# Patient Record
Sex: MALE | Race: WHITE | NOT HISPANIC OR LATINO | Employment: FULL TIME | URBAN - METROPOLITAN AREA
[De-identification: names, ages, dates, MRNs, and addresses within clinical notes are randomized per-mention and may not be internally consistent; named-entity substitution may affect disease eponyms.]

---

## 2023-09-20 ENCOUNTER — TELEPHONE (OUTPATIENT)
Dept: INTERNAL MEDICINE | Age: 33
End: 2023-09-20

## 2023-11-27 ENCOUNTER — LAB SERVICES (OUTPATIENT)
Dept: LAB | Age: 33
End: 2023-11-27
Attending: STUDENT IN AN ORGANIZED HEALTH CARE EDUCATION/TRAINING PROGRAM

## 2023-11-27 ENCOUNTER — OFFICE VISIT (OUTPATIENT)
Dept: INTERNAL MEDICINE | Age: 33
End: 2023-11-27
Attending: STUDENT IN AN ORGANIZED HEALTH CARE EDUCATION/TRAINING PROGRAM

## 2023-11-27 VITALS
SYSTOLIC BLOOD PRESSURE: 139 MMHG | DIASTOLIC BLOOD PRESSURE: 77 MMHG | OXYGEN SATURATION: 100 % | HEART RATE: 83 BPM | HEIGHT: 75 IN | WEIGHT: 171.1 LBS | BODY MASS INDEX: 21.27 KG/M2

## 2023-11-27 DIAGNOSIS — I10 PRIMARY HYPERTENSION: ICD-10-CM

## 2023-11-27 DIAGNOSIS — N41.1 PROSTATITIS, CHRONIC: ICD-10-CM

## 2023-11-27 DIAGNOSIS — Z11.3 ROUTINE SCREENING FOR STI (SEXUALLY TRANSMITTED INFECTION): ICD-10-CM

## 2023-11-27 DIAGNOSIS — Z80.8 FAMILY HISTORY OF MELANOMA: Primary | ICD-10-CM

## 2023-11-27 DIAGNOSIS — Z23 NEED FOR VACCINATION: ICD-10-CM

## 2023-11-27 DIAGNOSIS — Z30.2 ENCOUNTER FOR VASECTOMY: ICD-10-CM

## 2023-11-27 DIAGNOSIS — Z00.00 PREVENTATIVE HEALTH CARE: ICD-10-CM

## 2023-11-27 LAB — HBA1C MFR BLD: 4.9 % (ref 4.5–5.6)

## 2023-11-27 PROCEDURE — 83036 HEMOGLOBIN GLYCOSYLATED A1C: CPT

## 2023-11-27 PROCEDURE — 90715 TDAP VACCINE 7 YRS/> IM: CPT | Performed by: STUDENT IN AN ORGANIZED HEALTH CARE EDUCATION/TRAINING PROGRAM

## 2023-11-27 PROCEDURE — 86592 SYPHILIS TEST NON-TREP QUAL: CPT

## 2023-11-27 PROCEDURE — 87389 HIV-1 AG W/HIV-1&-2 AB AG IA: CPT

## 2023-11-27 PROCEDURE — 10002800 HB RX 250 W HCPCS: Performed by: STUDENT IN AN ORGANIZED HEALTH CARE EDUCATION/TRAINING PROGRAM

## 2023-11-27 PROCEDURE — 99202 OFFICE O/P NEW SF 15 MIN: CPT

## 2023-11-27 PROCEDURE — 99385 PREV VISIT NEW AGE 18-39: CPT | Performed by: STUDENT IN AN ORGANIZED HEALTH CARE EDUCATION/TRAINING PROGRAM

## 2023-11-27 PROCEDURE — 90471 IMMUNIZATION ADMIN: CPT | Performed by: STUDENT IN AN ORGANIZED HEALTH CARE EDUCATION/TRAINING PROGRAM

## 2023-11-27 PROCEDURE — 99203 OFFICE O/P NEW LOW 30 MIN: CPT | Performed by: STUDENT IN AN ORGANIZED HEALTH CARE EDUCATION/TRAINING PROGRAM

## 2023-11-27 RX ORDER — AMLODIPINE BESYLATE 5 MG/1
5 TABLET ORAL DAILY
Qty: 90 TABLET | Refills: 3 | Status: SHIPPED | OUTPATIENT
Start: 2023-11-27

## 2023-11-27 RX ADMIN — TETANUS TOXOID, REDUCED DIPHTHERIA TOXOID AND ACELLULAR PERTUSSIS VACCINE, ADSORBED 0.5 ML: 5; 2.5; 8; 8; 2.5 SUSPENSION INTRAMUSCULAR at 11:47

## 2023-11-27 ASSESSMENT — PATIENT HEALTH QUESTIONNAIRE - PHQ9
SUM OF ALL RESPONSES TO PHQ9 QUESTIONS 1 AND 2: 0
2. FEELING DOWN, DEPRESSED OR HOPELESS: NOT AT ALL
CLINICAL INTERPRETATION OF PHQ2 SCORE: NO FURTHER SCREENING NEEDED
SUM OF ALL RESPONSES TO PHQ9 QUESTIONS 1 AND 2: 0
1. LITTLE INTEREST OR PLEASURE IN DOING THINGS: NOT AT ALL

## 2023-11-28 LAB
HIV 1+2 AB+HIV1 P24 AG SERPL QL IA: NONREACTIVE
RPR SER QL: NONREACTIVE

## 2024-02-18 ENCOUNTER — E-ADVICE (OUTPATIENT)
Dept: INTERNAL MEDICINE | Age: 34
End: 2024-02-18

## 2024-02-19 ENCOUNTER — E-ADVICE (OUTPATIENT)
Dept: INTERNAL MEDICINE | Age: 34
End: 2024-02-19

## 2024-02-19 ENCOUNTER — TELEPHONE (OUTPATIENT)
Dept: INTERNAL MEDICINE | Age: 34
End: 2024-02-19

## 2024-02-19 DIAGNOSIS — I10 PRIMARY HYPERTENSION: ICD-10-CM

## 2024-02-29 ENCOUNTER — APPOINTMENT (OUTPATIENT)
Dept: LAB | Age: 34
End: 2024-02-29
Attending: STUDENT IN AN ORGANIZED HEALTH CARE EDUCATION/TRAINING PROGRAM

## 2024-02-29 ENCOUNTER — OFFICE VISIT (OUTPATIENT)
Dept: INTERNAL MEDICINE | Age: 34
End: 2024-02-29
Attending: STUDENT IN AN ORGANIZED HEALTH CARE EDUCATION/TRAINING PROGRAM

## 2024-02-29 VITALS
SYSTOLIC BLOOD PRESSURE: 123 MMHG | RESPIRATION RATE: 14 BRPM | DIASTOLIC BLOOD PRESSURE: 71 MMHG | HEIGHT: 75 IN | OXYGEN SATURATION: 99 % | BODY MASS INDEX: 21.42 KG/M2 | WEIGHT: 172.3 LBS | HEART RATE: 83 BPM

## 2024-02-29 DIAGNOSIS — I10 PRIMARY HYPERTENSION: Primary | ICD-10-CM

## 2024-02-29 DIAGNOSIS — J30.9 CHRONIC ALLERGIC RHINITIS: ICD-10-CM

## 2024-02-29 DIAGNOSIS — Z13.220 SCREENING FOR LIPID DISORDERS: ICD-10-CM

## 2024-02-29 LAB
CHOLEST SERPL-MCNC: 201 MG/DL
CHOLEST/HDLC SERPL: 3.5 {RATIO}
HDLC SERPL-MCNC: 57 MG/DL
LDLC SERPL CALC-MCNC: 123 MG/DL
NONHDLC SERPL-MCNC: 144 MG/DL
TRIGL SERPL-MCNC: 106 MG/DL

## 2024-02-29 PROCEDURE — 99214 OFFICE O/P EST MOD 30 MIN: CPT | Performed by: STUDENT IN AN ORGANIZED HEALTH CARE EDUCATION/TRAINING PROGRAM

## 2024-02-29 PROCEDURE — 80061 LIPID PANEL: CPT | Performed by: STUDENT IN AN ORGANIZED HEALTH CARE EDUCATION/TRAINING PROGRAM

## 2024-02-29 PROCEDURE — 83695 ASSAY OF LIPOPROTEIN(A): CPT | Performed by: STUDENT IN AN ORGANIZED HEALTH CARE EDUCATION/TRAINING PROGRAM

## 2024-03-03 LAB — LPA SERPL-MCNC: 70 MG/DL

## 2024-03-04 ENCOUNTER — E-ADVICE (OUTPATIENT)
Dept: INTERNAL MEDICINE | Age: 34
End: 2024-03-04

## 2024-04-16 ENCOUNTER — E-ADVICE (OUTPATIENT)
Dept: INTERNAL MEDICINE | Age: 34
End: 2024-04-16

## 2024-04-16 DIAGNOSIS — Z30.2 ENCOUNTER FOR VASECTOMY: Primary | ICD-10-CM

## 2024-05-04 ENCOUNTER — E-ADVICE (OUTPATIENT)
Dept: INTERNAL MEDICINE | Age: 34
End: 2024-05-04

## 2024-05-31 ENCOUNTER — TELEPHONE (OUTPATIENT)
Dept: UROLOGY | Age: 34
End: 2024-05-31

## 2024-05-31 ENCOUNTER — OFFICE VISIT (OUTPATIENT)
Dept: UROLOGY | Age: 34
End: 2024-05-31

## 2024-05-31 VITALS — RESPIRATION RATE: 16 BRPM | SYSTOLIC BLOOD PRESSURE: 126 MMHG | HEART RATE: 68 BPM | DIASTOLIC BLOOD PRESSURE: 82 MMHG

## 2024-05-31 DIAGNOSIS — Z30.09 VASECTOMY EVALUATION: Primary | ICD-10-CM

## 2024-06-13 ENCOUNTER — OFFICE VISIT (OUTPATIENT)
Dept: INTERNAL MEDICINE | Age: 34
End: 2024-06-13

## 2024-06-13 ENCOUNTER — LAB SERVICES (OUTPATIENT)
Dept: LAB | Age: 34
End: 2024-06-13

## 2024-06-13 VITALS
OXYGEN SATURATION: 100 % | HEIGHT: 75 IN | TEMPERATURE: 97.4 F | WEIGHT: 171.4 LBS | BODY MASS INDEX: 21.31 KG/M2 | SYSTOLIC BLOOD PRESSURE: 106 MMHG | DIASTOLIC BLOOD PRESSURE: 70 MMHG | HEART RATE: 72 BPM

## 2024-06-13 DIAGNOSIS — B35.4 TINEA CORPORIS: ICD-10-CM

## 2024-06-13 DIAGNOSIS — I10 PRIMARY HYPERTENSION: Primary | ICD-10-CM

## 2024-06-13 DIAGNOSIS — E78.41 ELEVATED LIPOPROTEIN(A): ICD-10-CM

## 2024-06-13 DIAGNOSIS — Z11.3 ROUTINE SCREENING FOR STI (SEXUALLY TRANSMITTED INFECTION): ICD-10-CM

## 2024-06-13 LAB — HIV 1+2 AB+HIV1 P24 AG SERPL QL IA: NONREACTIVE

## 2024-06-13 PROCEDURE — 87591 N.GONORRHOEAE DNA AMP PROB: CPT | Performed by: STUDENT IN AN ORGANIZED HEALTH CARE EDUCATION/TRAINING PROGRAM

## 2024-06-13 PROCEDURE — 87389 HIV-1 AG W/HIV-1&-2 AB AG IA: CPT

## 2024-06-13 PROCEDURE — 86592 SYPHILIS TEST NON-TREP QUAL: CPT

## 2024-06-13 PROCEDURE — 99214 OFFICE O/P EST MOD 30 MIN: CPT | Performed by: STUDENT IN AN ORGANIZED HEALTH CARE EDUCATION/TRAINING PROGRAM

## 2024-06-13 RX ORDER — AMLODIPINE BESYLATE 5 MG/1
5 TABLET ORAL DAILY
Qty: 90 TABLET | Refills: 3 | Status: SHIPPED | OUTPATIENT
Start: 2024-06-13

## 2024-06-13 ASSESSMENT — PATIENT HEALTH QUESTIONNAIRE - PHQ9
CLINICAL INTERPRETATION OF PHQ2 SCORE: NO FURTHER SCREENING NEEDED
SUM OF ALL RESPONSES TO PHQ9 QUESTIONS 1 AND 2: 0
1. LITTLE INTEREST OR PLEASURE IN DOING THINGS: NOT AT ALL
2. FEELING DOWN, DEPRESSED OR HOPELESS: NOT AT ALL
SUM OF ALL RESPONSES TO PHQ9 QUESTIONS 1 AND 2: 0

## 2024-06-14 LAB
C TRACH RRNA UR QL NAA+PROBE: NEGATIVE
Lab: NORMAL
N GONORRHOEA RRNA UR QL NAA+PROBE: NEGATIVE
RPR SER QL: NONREACTIVE

## 2024-06-25 ENCOUNTER — APPOINTMENT (OUTPATIENT)
Dept: DERMATOLOGY | Age: 34
End: 2024-06-25
Attending: STUDENT IN AN ORGANIZED HEALTH CARE EDUCATION/TRAINING PROGRAM

## 2024-06-25 ENCOUNTER — TELEPHONE (OUTPATIENT)
Dept: UROLOGY | Age: 34
End: 2024-06-25

## 2024-06-25 DIAGNOSIS — L91.8 SKIN TAG: ICD-10-CM

## 2024-06-25 DIAGNOSIS — L90.5 SCAR: ICD-10-CM

## 2024-06-25 DIAGNOSIS — D22.9 MULTIPLE NEVI: ICD-10-CM

## 2024-06-25 DIAGNOSIS — Z80.8 FAMILY HISTORY OF MALIGNANT MELANOMA OF SKIN: ICD-10-CM

## 2024-06-25 DIAGNOSIS — L92.0 GRANULOMA ANNULARE: ICD-10-CM

## 2024-06-25 DIAGNOSIS — Z12.83 SKIN CANCER SCREENING: Primary | ICD-10-CM

## 2024-06-25 PROCEDURE — 99204 OFFICE O/P NEW MOD 45 MIN: CPT | Performed by: DERMATOLOGY

## 2024-06-25 RX ORDER — TRIAMCINOLONE ACETONIDE 1 MG/G
OINTMENT TOPICAL
Qty: 30 G | Refills: 0 | Status: SHIPPED | OUTPATIENT
Start: 2024-06-25

## 2024-06-26 ENCOUNTER — APPOINTMENT (OUTPATIENT)
Dept: UROLOGY | Age: 34
End: 2024-06-26

## 2024-06-26 DIAGNOSIS — Z30.09 VASECTOMY EVALUATION: Primary | ICD-10-CM

## 2024-06-26 PROCEDURE — 55250 REMOVAL OF SPERM DUCT(S): CPT | Performed by: STUDENT IN AN ORGANIZED HEALTH CARE EDUCATION/TRAINING PROGRAM

## 2024-06-28 ENCOUNTER — E-ADVICE (OUTPATIENT)
Dept: UROLOGY | Age: 34
End: 2024-06-28

## 2024-06-28 ENCOUNTER — APPOINTMENT (OUTPATIENT)
Dept: INTERNAL MEDICINE | Age: 34
End: 2024-06-28
Attending: STUDENT IN AN ORGANIZED HEALTH CARE EDUCATION/TRAINING PROGRAM

## 2024-07-16 ENCOUNTER — TELEPHONE (OUTPATIENT)
Dept: UROLOGY | Age: 34
End: 2024-07-16

## 2024-07-17 ENCOUNTER — TELEPHONE (OUTPATIENT)
Dept: UROLOGY | Age: 34
End: 2024-07-17

## 2024-07-31 ENCOUNTER — APPOINTMENT (OUTPATIENT)
Dept: UROLOGY | Age: 34
End: 2024-07-31

## 2024-07-31 VITALS
WEIGHT: 171 LBS | SYSTOLIC BLOOD PRESSURE: 118 MMHG | BODY MASS INDEX: 21.26 KG/M2 | DIASTOLIC BLOOD PRESSURE: 62 MMHG | HEIGHT: 75 IN

## 2024-07-31 DIAGNOSIS — Z30.09 VASECTOMY EVALUATION: Primary | ICD-10-CM

## 2024-07-31 PROCEDURE — 99024 POSTOP FOLLOW-UP VISIT: CPT | Performed by: STUDENT IN AN ORGANIZED HEALTH CARE EDUCATION/TRAINING PROGRAM

## 2024-08-20 DIAGNOSIS — I10 PRIMARY HYPERTENSION: ICD-10-CM

## 2024-08-24 DIAGNOSIS — I10 PRIMARY HYPERTENSION: ICD-10-CM

## 2024-08-26 RX ORDER — AMLODIPINE BESYLATE 5 MG/1
5 TABLET ORAL DAILY
Qty: 90 TABLET | Refills: 3 | Status: SHIPPED | OUTPATIENT
Start: 2024-08-24 | End: 2024-08-27 | Stop reason: SDUPTHER

## 2024-08-27 DIAGNOSIS — I10 PRIMARY HYPERTENSION: ICD-10-CM

## 2024-08-28 RX ORDER — AMLODIPINE BESYLATE 5 MG/1
5 TABLET ORAL DAILY
Qty: 90 TABLET | Refills: 3 | OUTPATIENT
Start: 2024-08-28

## 2024-08-28 RX ORDER — AMLODIPINE BESYLATE 5 MG/1
5 TABLET ORAL DAILY
Qty: 90 TABLET | Refills: 3 | Status: SHIPPED | OUTPATIENT
Start: 2024-08-28

## 2024-11-14 DIAGNOSIS — I10 PRIMARY HYPERTENSION: ICD-10-CM

## 2024-11-15 RX ORDER — AMLODIPINE BESYLATE 5 MG/1
5 TABLET ORAL DAILY
Qty: 90 TABLET | Refills: 3 | Status: SHIPPED | OUTPATIENT
Start: 2024-11-15

## 2024-11-18 DIAGNOSIS — I10 PRIMARY HYPERTENSION: ICD-10-CM

## 2024-11-18 RX ORDER — AMLODIPINE BESYLATE 5 MG/1
5 TABLET ORAL DAILY
Qty: 90 TABLET | Refills: 3 | Status: CANCELLED | OUTPATIENT
Start: 2024-11-18

## 2024-11-26 ENCOUNTER — OFFICE VISIT (OUTPATIENT)
Dept: FAMILY MEDICINE | Facility: CLINIC | Age: 34
End: 2024-11-26
Payer: OTHER GOVERNMENT

## 2024-11-26 VITALS
OXYGEN SATURATION: 98 % | TEMPERATURE: 97.9 F | WEIGHT: 174.5 LBS | BODY MASS INDEX: 21.7 KG/M2 | HEART RATE: 81 BPM | RESPIRATION RATE: 17 BRPM | HEIGHT: 75 IN | SYSTOLIC BLOOD PRESSURE: 105 MMHG | DIASTOLIC BLOOD PRESSURE: 75 MMHG

## 2024-11-26 DIAGNOSIS — E78.41 HIGH SERUM LIPOPROTEIN(A): ICD-10-CM

## 2024-11-26 DIAGNOSIS — N48.89 PENILE PAIN: ICD-10-CM

## 2024-11-26 DIAGNOSIS — Z76.89 ENCOUNTER TO ESTABLISH CARE: Primary | ICD-10-CM

## 2024-11-26 DIAGNOSIS — I10 BENIGN ESSENTIAL HYPERTENSION: ICD-10-CM

## 2024-11-26 DIAGNOSIS — R10.2 PELVIC PAIN IN MALE: ICD-10-CM

## 2024-11-26 DIAGNOSIS — F41.9 ANXIETY: ICD-10-CM

## 2024-11-26 DIAGNOSIS — R30.0 DYSURIA: ICD-10-CM

## 2024-11-26 DIAGNOSIS — D13.4 HEPATOCELLULAR ADENOMA: ICD-10-CM

## 2024-11-26 DIAGNOSIS — M25.511 CHRONIC RIGHT SHOULDER PAIN: ICD-10-CM

## 2024-11-26 DIAGNOSIS — G89.29 CHRONIC RIGHT SHOULDER PAIN: ICD-10-CM

## 2024-11-26 PROCEDURE — 99204 OFFICE O/P NEW MOD 45 MIN: CPT | Performed by: STUDENT IN AN ORGANIZED HEALTH CARE EDUCATION/TRAINING PROGRAM

## 2024-11-26 RX ORDER — AMLODIPINE BESYLATE 5 MG/1
1 TABLET ORAL DAILY
COMMUNITY
Start: 2024-11-15 | End: 2024-11-26

## 2024-11-26 RX ORDER — OMEGA-3/DHA/EPA/FISH OIL 60 MG-90MG
500 CAPSULE ORAL DAILY
COMMUNITY

## 2024-11-26 RX ORDER — AMLODIPINE BESYLATE 5 MG/1
5 TABLET ORAL DAILY
Qty: 90 TABLET | Refills: 3 | Status: SHIPPED | OUTPATIENT
Start: 2024-11-26

## 2024-11-26 RX ORDER — TRIAMCINOLONE ACETONIDE 1 MG/G
OINTMENT TOPICAL 2 TIMES DAILY
COMMUNITY
Start: 2024-06-25

## 2024-11-26 ASSESSMENT — PAIN SCALES - GENERAL: PAINLEVEL_OUTOF10: MILD PAIN (2)

## 2024-11-26 NOTE — PROGRESS NOTES
Assessment & Plan     Encounter to establish care    Chronic right shoulder pain  Interested in PT. No localizing symptoms on exam to suggest specific pathology. Consider MRI/Sports Med if persistent/worsening despite PT  - Physical Therapy  Referral    Benign essential hypertension  High serum lipoprotein(a)  Controlled, goal <130/80 given young age and risk factor. Labs UTD in Care Everywhere.   - amLODIPine (NORVASC) 5 MG tablet  Dispense: 90 tablet; Refill: 3    Dysuria  Longstanding penile/pelvic symptoms as detailed below. Reports unremarkable workup through Urology in [place] - [facility] complted so we can try and find out what has already been done. He reports a dx of chronic prostatitis but without specific treatment recs, would be helpful to see specific workup. Particularly bothersome for sexual function. Has routine STI screening but does not think he has been tested for mycoplasma. Will also pursue PFPT. Offered referral to establish here w/ Urology but pt prefers to defer at this time.   - Urogenital Ureaplasma and Mycoplasma Species by PCR  - Physical Therapy  Referral    Pelvic pain in male  - Physical Therapy  Referral    Penile pain  - Physical Therapy  Referral    Anxiety  Would like to discuss possible med mgmt, will address at follow up     Hepatocellular adenoma    Care Everywhere reviewed. 2015 abd MRI showed 6.5 x 4.6cm hepatic mass, s/p biopsy with path 2/9/2015 c/w hepatic adenoma, no malignancy. He reports that no follow up or management was recommended and does not think he has discussed further w/ a specialist. Reports he had abd imaging last year through [place]rology in [place] that re-demonstrated the mass, do not have access to those records. LFTs normal at [facility] in 2023. Upon further review of up to date recs, will message pt with recommendation to re-image and refer to Hepatology to discuss further since surgical resection for men is recommended.  "    Return in about 3 months (around 2/26/2025) for Follow up, with me, for mood follow up.    Subjective   Ivan is a 34 year old, presenting for the following health issues:  Establish Care (New Est/Referral Questions)    HPI     Primary goal: establish care  HTN, on amlodipine. A pain to communicate w/ physician in [place]     Thinking again about trial-ing antidepressant for anxiety, has waffled for a long time. Would be helpful to have documented.     R shoulder: worry that he has maybe a rotator cuff injury. 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 Maybe since 2013   Starting to use the gym and lift more. Anything chest/shoulders/arms     For two years, has had penile pain that is an aching pain almost like a nerve pain that radiates from pelvis out to penis when he gets aroused. Large amount of aching after ejaculation.   - has been seen Urology     Sometimes a really intense sx that feels like UTI. Bad dysuria, chills, etc.     Urologist in [place] did a pelvis CT, said maybe chronic prostatitis?     Did accupuncture for prostatitis     [facility]     Lipoprotein a test +     Has a flu and covid shot: October 2024         Objective    /75 (BP Location: Left arm, Patient Position: Sitting, Cuff Size: Adult Regular)   Pulse 81   Temp 97.9  F (36.6  C) (Oral)   Resp 17   Ht 1.905 m (6' 3\")   Wt 79.2 kg (174 lb 8 oz)   SpO2 98%   BMI 21.81 kg/m    Body mass index is 21.81 kg/m .  Physical Exam   GENERAL: alert, cooperative, in no acute distress  HEENT: sclera clear  PULM: normal respiratory effort   Right shoulder:  - Inspections: No deformities. No muscle atrophy. Mild scapular winging  - Tenderness to " palpation: nonfocal   - AROM: full, intact withi minimal discomfort   - Strength: Supraspinatus (empty can) wnl, ER wnl, IR wnl  - Impingement:  Rowley neg, Neer neg  NEURO: alert and oriented, grossly intact, moves all extremities, normal gait   SKIN: no rashes or lesions visualized   PSYCH: euthymic affect              Signed Electronically by: Nadya Pimentel DO

## 2024-12-05 PROBLEM — E78.41 HIGH SERUM LIPOPROTEIN(A): Status: ACTIVE | Noted: 2024-12-05

## 2024-12-10 ENCOUNTER — MYC MEDICAL ADVICE (OUTPATIENT)
Dept: FAMILY MEDICINE | Facility: CLINIC | Age: 34
End: 2024-12-10

## 2024-12-10 ENCOUNTER — LAB (OUTPATIENT)
Dept: LAB | Facility: CLINIC | Age: 34
End: 2024-12-10
Payer: OTHER GOVERNMENT

## 2024-12-10 DIAGNOSIS — D13.4 HEPATOCELLULAR ADENOMA: Primary | ICD-10-CM

## 2024-12-10 DIAGNOSIS — R30.0 DYSURIA: ICD-10-CM

## 2024-12-13 NOTE — TELEPHONE ENCOUNTER
Hepatology referral for eval and mgmt recs for hepatocellular adenoma. All records in outside systems. Initial discovery and workup via [facility] in [place] in 2015. Imaging and pathology records available in Care Everywhere. Incidentally noted on CT abdomen/pelvis 3/29/22, report scanned into Media Tab.

## 2024-12-16 ENCOUNTER — TELEPHONE (OUTPATIENT)
Dept: GASTROENTEROLOGY | Facility: CLINIC | Age: 34
End: 2024-12-16
Payer: OTHER GOVERNMENT

## 2024-12-16 ENCOUNTER — MYC MEDICAL ADVICE (OUTPATIENT)
Dept: FAMILY MEDICINE | Facility: CLINIC | Age: 34
End: 2024-12-16
Payer: OTHER GOVERNMENT

## 2024-12-16 DIAGNOSIS — D13.4 HEPATOCELLULAR ADENOMA: Primary | ICD-10-CM

## 2024-12-16 DIAGNOSIS — A49.3 NGU DUE TO UREAPLASMA UREALYTICUM: Primary | ICD-10-CM

## 2024-12-16 DIAGNOSIS — N34.1 NGU DUE TO UREAPLASMA UREALYTICUM: Primary | ICD-10-CM

## 2024-12-16 RX ORDER — DOXYCYCLINE HYCLATE 100 MG
100 TABLET ORAL 2 TIMES DAILY
Qty: 14 TABLET | Refills: 0 | Status: SHIPPED | OUTPATIENT
Start: 2024-12-16 | End: 2024-12-16

## 2024-12-16 RX ORDER — DOXYCYCLINE HYCLATE 100 MG
100 TABLET ORAL 2 TIMES DAILY
Qty: 14 TABLET | Refills: 0 | Status: SHIPPED | OUTPATIENT
Start: 2024-12-16 | End: 2024-12-19

## 2024-12-16 NOTE — TELEPHONE ENCOUNTER
Left message for pt to return call.    Will inform pt lab first appointment has been scheduled per provider request prior to his appointment with Dr. Samuel on 1/24.  Will also send a my chart message.    -----------------------------------------------------------------------------------------------------------------------    Adri: Schedule next available - MD or ABDIAZIZ ok. Labs prior: CBC, CMP, INR.     Laura Reynolds

## 2024-12-19 RX ORDER — DOXYCYCLINE HYCLATE 100 MG
100 TABLET ORAL 2 TIMES DAILY
Qty: 14 TABLET | Refills: 0 | Status: SHIPPED | OUTPATIENT
Start: 2024-12-19

## 2024-12-19 ASSESSMENT — ACTIVITIES OF DAILY LIVING (ADL)
PLACING_AN_OBJECT_ON_A_HIGH_SHELF: 0
PUTTING_ON_AN_UNDERSHIRT_OR_A_PULLOVER_SWEATER: 0
PUSHING_WITH_THE_INVOLVED_ARM: 4
WASHING_YOUR_HAIR?: 0
PLEASE_INDICATE_YOR_PRIMARY_REASON_FOR_REFERRAL_TO_THERAPY:: SHOULDER
CARRYING_A_HEAVY_OBJECT_OF_10_POUNDS: 0
PUTTING_ON_YOUR_PANTS: 0
TOUCHING_THE_BACK_OF_YOUR_NECK: 0
REMOVING_SOMETHING_FROM_YOUR_BACK_POCKET: 0
AT_ITS_WORST?: 7
PUTTING_ON_A_SHIRT_THAT_BUTTONS_DOWN_THE_FRONT: 0
REACHING_FOR_SOMETHING_ON_A_HIGH_SHELF: 0
WHEN_LYING_ON_THE_INVOLVED_SIDE: 5
WASHING_YOUR_BACK: 0

## 2024-12-20 NOTE — PROGRESS NOTES
PHYSICAL THERAPY EVALUATION  Type of Visit: Evaluation              Subjective         Presenting condition or subjective complaint: (Patient-Rptd) Shoulder imbalance/winged scapula and rotator cuff pain with lifting/exercising.  Date of onset: 11/26/24 (PT referral date)    Relevant medical history: (Patient-Rptd) High blood pressure; Mental Illness   Dates & types of surgery:      Prior diagnostic imaging/testing results:       Prior therapy history for the same diagnosis, illness or injury: (Patient-Rptd) No      Living Environment  Social support: (Patient-Rptd) Alone   Type of home: (Patient-Rptd) Apartment/condo   Stairs to enter the home: (Patient-Rptd) Yes (Patient-Rptd) 20 Is there a railing: (Patient-Rptd) Yes     Ramp: (Patient-Rptd) No   Stairs inside the home: (Patient-Rptd) No       Help at home: (Patient-Rptd) None  Equipment owned:       Employment: (Patient-Rptd) Yes (Patient-Rptd) Psychologist  Hobbies/Interests: (Patient-Rptd) Hiking, biking, music (drumming, guitar), reading    Patient goals for therapy: (Patient-Rptd) Lift weights without pain, move without extra pain, 'crunching'    Ivan Goyal is a 34 year old male with a right shoulder condition. Mechanism of injury: Chronic right shoulder pain. Where: (home, work, MVA, community, recreation/sport, unknown, other): NA. Location of symptoms: anterior, stiffness in right upper trap and entire right UE. Pain level on number scale: 0-7/10. Quality of pain: heat. Associated symptoms: intermittent numbness in digits 2-5 on right. Pain frequency (constant/intermittent): intermittent. Symptoms are exacerbated by: lying on side, pushing, lifting, shoulder AROM, sleeping. Symptoms are relieved by: rest. Progression of symptoms since onset (same/better/worse): worse. Special tests (x-ray, MRI, CT scan, EMG, bone scan): None. Previous treatment: None. Improvement with previous treatment: NA. General health as reported by patient is good.  Pertinent medical history includes:  see Epic. Medical allergies includes: see Epic. Surgical history includes: see Epic. Current medications include: see Epic. Occupation: Psychologist. Patient is (working in normal job without restrictions/working in normal job with restrictions/working in an alternate job/not working due to present treatment problem): working in normal job. Primary job tasks: sitting, computer work. Barriers at home/work: None reported by patient. Red flags: None reported by patient.     Objective   Posture    Forward Head +   Rounded Shoulders    Scapular Winging +     Bilateral shoulder AROM WNL - right shoulder pain during IR    Shoulder Strength (* = pain) Right Left   FL 5/5 5/5   ABD 5-/5 5/5   ER 5-/5 5/5   IR 5/5 5/5   Biceps 5/5 5/5     Special Tests Right Left   Sandra Holt     Charles Mix     Anterior Aprehension     Posterior Apprehension     Sulcus Sign     AC Crossover     Drop-Arm     Lift-off Sign     Painful Arc -      Palpation tenderness:  Right biceps and supraspinatus  Cervical AROM min loss grossly throughout  Cervical dermatomes and myotomes WNL  Assessment & Plan   CLINICAL IMPRESSIONS  Medical Diagnosis: Chronic right shoulder pain    Treatment Diagnosis: Chronic right shoulder pain   Impression/Assessment: Patient is a 34 year old male with right shoulder complaints.  The following significant findings have been identified: Pain, Decreased ROM/flexibility, Decreased joint mobility, Decreased strength, Impaired muscle performance, Decreased activity tolerance, and Impaired posture. These impairments interfere with their ability to perform self care tasks, work tasks, recreational activities, and household chores as compared to previous level of function.     Clinical Decision Making (Complexity):  Clinical Presentation: Stable/Uncomplicated  Clinical Presentation Rationale: based on medical and personal factors listed in PT evaluation  Clinical Decision Making  (Complexity): Low complexity    PLAN OF CARE  Treatment Interventions:  Interventions: Manual Therapy, Neuromuscular Re-education, Therapeutic Activity, Therapeutic Exercise, Self-Care/Home Management    Long Term Goals     PT Goal 1  Goal Description: Patient will be able to sleep throughout the night without pain.  Rationale: to maximize safety and independence within the home  Target Date: 03/18/25      Frequency of Treatment: 1x/week  Duration of Treatment: for 4 weeks tapering down to 2x/month for 8 weeks    Recommended Referrals to Other Professionals:  None  Education Assessment:   Learner/Method: Patient;No Barriers to Learning    Risks and benefits of evaluation/treatment have been explained.   Patient/Family/caregiver agrees with Plan of Care.     Evaluation Time:     PT Eval, Low Complexity Minutes (58736): 15  Signing Clinician: Lakhwinder Monroy PT

## 2024-12-24 ENCOUNTER — THERAPY VISIT (OUTPATIENT)
Dept: PHYSICAL THERAPY | Facility: CLINIC | Age: 34
End: 2024-12-24
Attending: STUDENT IN AN ORGANIZED HEALTH CARE EDUCATION/TRAINING PROGRAM
Payer: OTHER GOVERNMENT

## 2024-12-24 DIAGNOSIS — G89.29 CHRONIC RIGHT SHOULDER PAIN: ICD-10-CM

## 2024-12-24 DIAGNOSIS — M25.511 CHRONIC RIGHT SHOULDER PAIN: ICD-10-CM

## 2024-12-24 PROCEDURE — 97530 THERAPEUTIC ACTIVITIES: CPT | Mod: GP | Performed by: PHYSICAL THERAPIST

## 2024-12-24 PROCEDURE — 97110 THERAPEUTIC EXERCISES: CPT | Mod: GP | Performed by: PHYSICAL THERAPIST

## 2024-12-24 PROCEDURE — 97161 PT EVAL LOW COMPLEX 20 MIN: CPT | Mod: GP | Performed by: PHYSICAL THERAPIST

## 2024-12-30 ENCOUNTER — THERAPY VISIT (OUTPATIENT)
Dept: PHYSICAL THERAPY | Facility: CLINIC | Age: 34
End: 2024-12-30
Attending: STUDENT IN AN ORGANIZED HEALTH CARE EDUCATION/TRAINING PROGRAM
Payer: OTHER GOVERNMENT

## 2024-12-30 DIAGNOSIS — R30.0 DYSURIA: ICD-10-CM

## 2024-12-30 DIAGNOSIS — R10.2 PELVIC PAIN IN MALE: ICD-10-CM

## 2024-12-30 DIAGNOSIS — N48.89 PENILE PAIN: ICD-10-CM

## 2024-12-30 PROCEDURE — 97530 THERAPEUTIC ACTIVITIES: CPT | Mod: GP

## 2024-12-30 PROCEDURE — 97110 THERAPEUTIC EXERCISES: CPT | Mod: GP

## 2024-12-30 PROCEDURE — 97162 PT EVAL MOD COMPLEX 30 MIN: CPT | Mod: GP

## 2024-12-30 NOTE — PROGRESS NOTES
PHYSICAL THERAPY EVALUATION  Type of Visit: Evaluation       Fall Risk Screen:  Fall screen completed by: PT  Have you fallen 2 or more times in the past year?: No  Have you fallen and had an injury in the past year?: No  Is patient a fall risk?: No    Subjective         Presenting condition or subjective complaint: (Patient-Rptd) Shoulder imbalance/winged scapula and rotator cuff pain with lifting/exercising.    Patient presents to physical therapy for evaluation. Patient reports symptoms pain and discomfort at the penis which will last for 4-5 days after ejaculation. Patient also reports the pelvic pain discomfort (cramping, urgency, aching/burning, radiating into pelvis) for approximately 3.5 years and has been better over the last year. Patient reports that he reports acupuncture had been helpful in the past. Patient report that advil has been helpful in the past and very hot baths to manage symptoms. Patient reports that symptoms are generally associated with sexual activity.     Date of onset: 11/26/24 (referral date)    Relevant medical history: (Patient-Rptd) High blood pressure; Mental Illness   No past medical history on file.   Dates & types of surgery:    No past surgical history on file.   Prior diagnostic imaging/testing results:       Prior therapy history for the same diagnosis, illness or injury: (Patient-Rptd) No      Prior Level of Function  Transfers: Independent  Ambulation: Independent  ADL: Independent  IADL:  Independent    Living Environment  Social support: (Patient-Rptd) Alone   Type of home: (Patient-Rptd) Apartment/condo   Stairs to enter the home: (Patient-Rptd) Yes (Patient-Rptd) 20 Is there a railing: (Patient-Rptd) Yes     Ramp: (Patient-Rptd) No   Stairs inside the home: (Patient-Rptd) No       Help at home: (Patient-Rptd) None  Equipment owned:       Employment: (Patient-Rptd) Yes (Patient-Rptd) Psychologist  Hobbies/Interests: (Patient-Rptd) Hiking, biking, music (drumming,  lukas, reading    Patient goals for therapy: (Patient-Rptd) Lift weights without pain, move without extra pain, 'crunching'    Pain assessment: Pain denied     Objective      PELVIC EVALUATION  ADDITIONAL HISTORY:  Sex assigned at birth: (Patient-Rptd) Male  Gender identity: (Patient-Rptd) Male    Pronouns: (Patient-Rptd) He/Him/His      Bladder History:  Feels bladder filling: (Patient-Rptd) Yes  Triggers for feeling of inability to wait to go to the bathroom: (Patient-Rptd) Yes (Patient-Rptd) A UTI-like sensation and burning that can often happen after sexual arousal or ejaculation.  How long can you wait to urinate: (Patient-Rptd) 15-20 minutes  Gets up at night to urinate: (Patient-Rptd) No    Can stop the flow of urine when urinating: (Patient-Rptd) Yes  Volume of urine usually released: (Patient-Rptd) Average   Other issues: (Patient-Rptd) Dribbling after urinating  Number of bladder infections in last 12 months:    Fluid intake per day: (Patient-Rptd) 128 mL (generally 3-5 32 oz N[place] per day) (Patient-Rptd) 1-2 cups of coffee per day, typically half caff (Patient-Rptd) 1 unit of alcohol every 2-3 weeks  Medications taken for bladder: (Patient-Rptd) No     Activities causing urine leak:      Amount of urine typically leaked:    Pads used to help with leaking: (Patient-Rptd) No        Bowel History:  Frequency of bowel movement: (Patient-Rptd) 1-2 times per day  Consistency of stool: (Patient-Rptd) Soft    Ignores the urge to defecate: (Patient-Rptd) No  Other bowel issues:    Length of time spent trying to have a bowel movement:      Sexual Function History:  Sexual orientation: (Patient-Rptd) Queer    Sexually active: (Patient-Rptd) Yes  Lubrication used: (Patient-Rptd) Yes (Patient-Rptd) Yes  Pelvic pain: (Patient-Rptd) Sitting; Initial penetration (rectal or vaginal)    Pain or difficulty with orgasms/erection/ejaculation: (Patient-Rptd) Yes (Patient-Rptd) Aching in my penis with erection at times,  definitely aching after ejaculation, sometimes a sense of UTI-like pain after ejaculation. Typically feel penis aches too much to have sex multiple times or two days in a row.  State of menopause:    Hormone medications: (Patient-Rptd) No      Are you currently pregnant: (Patient-Rptd) No  Have you been diagnosed with pelvic prolapse or abdominal separation: (Patient-Rptd) No  Do you get regular exercise: (Patient-Rptd) Yes  I do this type of exercise: (Patient-Rptd) Typically moderate weight-bearing exercise 1-2 times per week, moderate cardio 2-5 times per week.  Have you tried pelvic floor strengthening exercises for 4 weeks: (Patient-Rptd) No  Do you have any history of trauma that is relevant to your care that you d like to share: (Patient-Rptd) No    Discussed reason for referral regarding pelvic health needs and external/internal pelvic floor muscle examination with patient/guardian.  Opportunity provided to ask questions and verbal consent for assessment and intervention was given.    PAIN: Pain Level at Rest: 0/10  Pain Level with Use: 7/10  Pain Location: burning through urethra, tip of penis, pelvic pain  Pain Quality: aching, feels like penis was blown up too far  Pain Frequency: 1-2x for penile pain per week, 1-2x for pelvic pain per month, or related to sexual activity  Pain is Worst: related to activity  Pain is Exacerbated By: sexual activity, masturbation, ejaculation  Pain is Relieved By: heat and advil, urinating before sexual activity  Pain Progression: Unchanged    POSTURE: Standing Posture: Rounded shoulders, Thoracic kyphosis increased, Genu recurvatum, no pain with standing  Sitting Posture: Rounded shoulders, Forward head, Thoracic kyphosis increased  LUMBAR SCREEN: AROM WNL  HIP SCREEN:  Strength: WNL   HIP ROM: WNL and symmetrical bilaterally  Functional Strength Testing:     PELVIC/SI SCREEN:     PAIN PROVOCATION TEST:   PELVIS/SI SPECIAL TESTS:    Left Right Additional Notes   Pelvic  Compression negative negative    Pelvic Gapping negative negative    Sacral Thrust negative negative      BREATHING SYMMETRY:  demonstrates upper chest breathing pattern at rest    PELVIC EXAM  External Visual Inspection:  At rest: Elevated perineal body  With voluntary pelvic floor contraction: Perineal elevation, Present  Relaxation of PFM: Yes  With intra-abdominal pressure: Bearing down as defecation: Perineal descent    Integumentary:   Anal: WNL    External Digital Palpation per Perineum:   Levator ani: Unremarkable  Coccyx: Unremarkable  Public symphysis: Tenderness    Scar:   Location/Type:   Mobility:     Internal Digital Palpation:  Per Vagina:      Per Rectum:  Tenderness  Myofascial Resistance to Palpation: Taut  Digital Muscle Performance: P (Power): 4  E (Endurance): 10 seconds  R (Repetitions): 3  F (Fast Twitch): 10 performed  Compensations: Gluts  Relaxation Post-Contraction: Partial/delayed relaxation, able to get improved relaxation with greater time      Pelvic Organ Prolapse:       ABDOMINAL ASSESSMENT  Diastasis Rectus Abdominis (KATERINE):      Abdominal Activation/Strength:     Scar:   Location/Type:   Mobility:     Fascial Tension/Restriction:     BIOFEEDBACK:  Position:   Surface Electrodes:     Abdominals:     Perianals:       DERMATOMES:   DTR S:     Assessment & Plan   CLINICAL IMPRESSIONS  Medical Diagnosis: Dysuria  Pelvic pain in male  Penile pain    Treatment Diagnosis:     Impression/Assessment: Patient is a 34 year old male with pelvic pain, penile pain, urinary dysfunction complaints.  The following significant findings have been identified: Pain, Decreased ROM/flexibility, Decreased proprioception, Impaired muscle performance, and Decreased activity tolerance. These impairments interfere with their ability to perform self care tasks, work tasks, and recreational activities as compared to previous level of function.     Clinical Decision Making (Complexity):  Clinical Presentation:  Stable/Uncomplicated  Clinical Presentation Rationale: based on medical and personal factors listed in PT evaluation  Clinical Decision Making (Complexity): Moderate complexity    PLAN OF CARE  Treatment Interventions:  Modalities: Biofeedback, Ultrasound  Interventions: Manual Therapy, Neuromuscular Re-education, Therapeutic Activity, Therapeutic Exercise, Self-Care/Home Management    Long Term Goals            Frequency of Treatment: 1x per week for 3 weeks, 1x every other week for 9 weeks  Duration of Treatment: 12 weeks    Recommended Referrals to Other Professionals:   Education Assessment:   Learner/Method: Patient    Risks and benefits of evaluation/treatment have been explained.   Patient/Family/caregiver agrees with Plan of Care.     Evaluation Time:             Signing Clinician: Darci Wiggins, PT

## 2025-01-07 ENCOUNTER — THERAPY VISIT (OUTPATIENT)
Dept: PHYSICAL THERAPY | Facility: CLINIC | Age: 35
End: 2025-01-07
Attending: STUDENT IN AN ORGANIZED HEALTH CARE EDUCATION/TRAINING PROGRAM
Payer: OTHER GOVERNMENT

## 2025-01-07 DIAGNOSIS — R30.0 DYSURIA: ICD-10-CM

## 2025-01-07 DIAGNOSIS — R10.2 PELVIC PAIN IN MALE: ICD-10-CM

## 2025-01-07 DIAGNOSIS — N48.89 PENILE PAIN: Primary | ICD-10-CM

## 2025-01-07 PROCEDURE — 97535 SELF CARE MNGMENT TRAINING: CPT | Mod: GP

## 2025-01-07 PROCEDURE — 97530 THERAPEUTIC ACTIVITIES: CPT | Mod: GP

## 2025-01-07 NOTE — PROGRESS NOTES
Biofeedback Assessment in PT:     In supine, 5 second contract-relax:    With insertion of pelvic wand rectally, working on breathing and contract-relax-bulge:

## 2025-01-08 ENCOUNTER — THERAPY VISIT (OUTPATIENT)
Dept: PHYSICAL THERAPY | Facility: CLINIC | Age: 35
End: 2025-01-08
Attending: STUDENT IN AN ORGANIZED HEALTH CARE EDUCATION/TRAINING PROGRAM
Payer: OTHER GOVERNMENT

## 2025-01-08 DIAGNOSIS — M25.511 CHRONIC RIGHT SHOULDER PAIN: Primary | ICD-10-CM

## 2025-01-08 DIAGNOSIS — G89.29 CHRONIC RIGHT SHOULDER PAIN: Primary | ICD-10-CM

## 2025-01-08 PROCEDURE — 97110 THERAPEUTIC EXERCISES: CPT | Mod: GP | Performed by: PHYSICAL THERAPIST

## 2025-01-08 PROCEDURE — 97112 NEUROMUSCULAR REEDUCATION: CPT | Mod: GP | Performed by: PHYSICAL THERAPIST

## 2025-01-13 ENCOUNTER — THERAPY VISIT (OUTPATIENT)
Dept: PHYSICAL THERAPY | Facility: CLINIC | Age: 35
End: 2025-01-13
Attending: STUDENT IN AN ORGANIZED HEALTH CARE EDUCATION/TRAINING PROGRAM
Payer: COMMERCIAL

## 2025-01-13 DIAGNOSIS — R30.0 DYSURIA: ICD-10-CM

## 2025-01-13 DIAGNOSIS — N48.89 PENILE PAIN: Primary | ICD-10-CM

## 2025-01-13 DIAGNOSIS — R10.2 PELVIC PAIN IN MALE: ICD-10-CM

## 2025-01-13 PROCEDURE — 90912 BFB TRAINING 1ST 15 MIN: CPT | Mod: GP

## 2025-01-13 PROCEDURE — 97530 THERAPEUTIC ACTIVITIES: CPT | Mod: GP

## 2025-01-13 PROCEDURE — 97110 THERAPEUTIC EXERCISES: CPT | Mod: GP

## 2025-01-17 ENCOUNTER — ANCILLARY PROCEDURE (OUTPATIENT)
Dept: MRI IMAGING | Facility: CLINIC | Age: 35
End: 2025-01-17
Attending: STUDENT IN AN ORGANIZED HEALTH CARE EDUCATION/TRAINING PROGRAM
Payer: COMMERCIAL

## 2025-01-17 DIAGNOSIS — D13.4 HEPATOCELLULAR ADENOMA: ICD-10-CM

## 2025-01-17 PROCEDURE — A9581 GADOXETATE DISODIUM INJ: HCPCS | Performed by: RADIOLOGY

## 2025-01-17 PROCEDURE — 74183 MRI ABD W/O CNTR FLWD CNTR: CPT | Performed by: RADIOLOGY

## 2025-01-17 NOTE — DISCHARGE INSTRUCTIONS
MRI Contrast Discharge Instructions    The IV contrast you received today will pass out of your body in your  urine. This will happen in the next 24 hours. You will not feel this process.  Your urine will not change color.    Drink at least 4 extra glasses of water or juice today (unless your doctor  has restricted your fluids). This reduces the stress on your kidneys.  You may take your regular medicines.    If you are on dialysis: It is best to have dialysis today.    If you have a reaction: Most reactions happen right away. If you have  any new symptoms after leaving the hospital (such as hives or swelling),  call your hospital at the correct number below. Or call your family doctor.  If you have breathing distress or wheezing, call 911.    Special instructions: ***    I have read and understand the above information.    Signature:______________________________________ Date:___________    Staff:__________________________________________ Date:___________     Time:__________    [facility] Departments:    ___[place]: [phone]  ___[facility]: [phone]  ___[place]: [phone] ___[facility]: [phone]  ___[facility]: [phone]  ___[facility]: [phone]  ___[phone]  ___[facility]: [phone]  ___[phone]

## 2025-01-19 ENCOUNTER — HEALTH MAINTENANCE LETTER (OUTPATIENT)
Age: 35
End: 2025-01-19

## 2025-01-24 ENCOUNTER — LAB (OUTPATIENT)
Dept: LAB | Facility: CLINIC | Age: 35
End: 2025-01-24
Payer: COMMERCIAL

## 2025-01-24 DIAGNOSIS — D13.4 HEPATOCELLULAR ADENOMA: ICD-10-CM

## 2025-01-24 LAB
ALBUMIN SERPL BCG-MCNC: 4.6 G/DL (ref 3.5–5.2)
ALP SERPL-CCNC: 62 U/L (ref 40–150)
ALT SERPL W P-5'-P-CCNC: 20 U/L (ref 0–70)
ANION GAP SERPL CALCULATED.3IONS-SCNC: 9 MMOL/L (ref 7–15)
AST SERPL W P-5'-P-CCNC: 23 U/L (ref 0–45)
BILIRUB SERPL-MCNC: 0.4 MG/DL
BUN SERPL-MCNC: 17.2 MG/DL (ref 6–20)
CALCIUM SERPL-MCNC: 9.3 MG/DL (ref 8.8–10.4)
CHLORIDE SERPL-SCNC: 104 MMOL/L (ref 98–107)
CREAT SERPL-MCNC: 0.86 MG/DL (ref 0.67–1.17)
EGFRCR SERPLBLD CKD-EPI 2021: >90 ML/MIN/1.73M2
ERYTHROCYTE [DISTWIDTH] IN BLOOD BY AUTOMATED COUNT: 12.6 % (ref 10–15)
GLUCOSE SERPL-MCNC: 87 MG/DL (ref 70–99)
HCO3 SERPL-SCNC: 26 MMOL/L (ref 22–29)
HCT VFR BLD AUTO: 44.5 % (ref 40–53)
HGB BLD-MCNC: 15 G/DL (ref 13.3–17.7)
INR PPP: 1.08 (ref 0.85–1.15)
MCH RBC QN AUTO: 29.1 PG (ref 26.5–33)
MCHC RBC AUTO-ENTMCNC: 33.7 G/DL (ref 31.5–36.5)
MCV RBC AUTO: 86 FL (ref 78–100)
PLATELET # BLD AUTO: 198 10E3/UL (ref 150–450)
POTASSIUM SERPL-SCNC: 4.2 MMOL/L (ref 3.4–5.3)
PROT SERPL-MCNC: 7 G/DL (ref 6.4–8.3)
RBC # BLD AUTO: 5.16 10E6/UL (ref 4.4–5.9)
SODIUM SERPL-SCNC: 139 MMOL/L (ref 135–145)
WBC # BLD AUTO: 5 10E3/UL (ref 4–11)

## 2025-01-24 PROCEDURE — 80053 COMPREHEN METABOLIC PANEL: CPT | Performed by: PATHOLOGY

## 2025-01-24 PROCEDURE — 36415 COLL VENOUS BLD VENIPUNCTURE: CPT | Performed by: PATHOLOGY

## 2025-01-24 PROCEDURE — 85027 COMPLETE CBC AUTOMATED: CPT | Performed by: PATHOLOGY

## 2025-01-24 PROCEDURE — 85610 PROTHROMBIN TIME: CPT | Performed by: PATHOLOGY

## 2025-01-28 NOTE — TELEPHONE ENCOUNTER
RECORDS STATUS - ALL OTHER DIAGNOSIS      Imaging DISC Received  January 30, 2025 2:39 PM    Action: Imaging disc from [facility] received and taken to Narcisa FARAH for upload.     Action    Action Taken 1/28/25  Spoke w/ [facility] - confirmed they have imaging. Emailed request for imaging.    Spoke w/ Sarah @ [facility] - advised they participate in [facility]. Requested imaging via [facility].  10:20 AM       RECORDS RECEIVED FROM:    DATE RECEIVED:    NOTES STATUS DETAILS   OFFICE NOTE from referring provider     OFFICE NOTE from medical oncologist     DISCHARGE SUMMARY from hospital     DISCHARGE REPORT from the ER     OPERATIVE REPORT     MEDICATION LIST     CLINICAL TRIAL TREATMENTS TO DATE     LABS     PATHOLOGY REPORTS     ANYTHING RELATED TO DIAGNOSIS     PATHOLOGY FEDEX TRACKING   TRACKING #:    GENONOMIC TESTING     TYPE:     IMAGING (NEED IMAGES & REPORT)     CT SCANS  [facility]  3/29/22    [facility]  1/7/15   MRI  [facility]  1/28/15   XRAYS     ULTRASOUND  [facility]  2/9/15   PET     IMAGE DISC FEDEX TRACKING   [facility]  TRACKING #: [id number]

## 2025-02-03 ENCOUNTER — THERAPY VISIT (OUTPATIENT)
Dept: PHYSICAL THERAPY | Facility: CLINIC | Age: 35
End: 2025-02-03
Payer: COMMERCIAL

## 2025-02-03 DIAGNOSIS — M25.511 CHRONIC RIGHT SHOULDER PAIN: Primary | ICD-10-CM

## 2025-02-03 DIAGNOSIS — G89.29 CHRONIC RIGHT SHOULDER PAIN: Primary | ICD-10-CM

## 2025-02-03 PROCEDURE — 97110 THERAPEUTIC EXERCISES: CPT | Mod: GP | Performed by: PHYSICAL THERAPIST

## 2025-02-03 PROCEDURE — 97112 NEUROMUSCULAR REEDUCATION: CPT | Mod: GP | Performed by: PHYSICAL THERAPIST

## 2025-02-03 NOTE — PROGRESS NOTES
"Virtual Visit Details    Type of service:  Video Visit     3:00 PM - 3:46 PM    Originating Location (pt. Location): {video visit patient location:576250::\"Home\"}  {PROVIDER LOCATION On-site should be selected for visits conducted from your clinic location or adjoining [facility] hospital, academic office, or other nearby [facility] building. Off-site should be selected for all other provider locations, including home:390685}  Distant Location (provider location):  {virtual location provider:358306}  Platform used for Video Visit: {Virtual Visit Platforms:975107::\"PixelSteam\"}      Surgical Oncology - New Patient  2/4/2025    ***    Of Note: ***    ***    Liver Biopsy (2/9/2015): ***    MRI Liver (1/17/2025): ***    Labs (1/24/2025): ***    Assessment/Plan:  ***    Questions answered and the patient was in agreement with and understanding of the plan.    A total of *** minutes were spent on this encounter including face to face consultation, imaging review, chart review, documentation, and coordination of care.  "

## 2025-02-04 ENCOUNTER — VIRTUAL VISIT (OUTPATIENT)
Dept: SURGERY | Facility: CLINIC | Age: 35
End: 2025-02-04
Attending: INTERNAL MEDICINE
Payer: COMMERCIAL

## 2025-02-04 ENCOUNTER — PRE VISIT (OUTPATIENT)
Dept: SURGERY | Facility: CLINIC | Age: 35
End: 2025-02-04
Payer: COMMERCIAL

## 2025-02-04 VITALS
BODY MASS INDEX: 21.14 KG/M2 | HEIGHT: 75 IN | DIASTOLIC BLOOD PRESSURE: 65 MMHG | WEIGHT: 170 LBS | SYSTOLIC BLOOD PRESSURE: 132 MMHG

## 2025-02-04 DIAGNOSIS — D13.4 HEPATOCELLULAR ADENOMA: Primary | ICD-10-CM

## 2025-02-04 ASSESSMENT — PAIN SCALES - GENERAL: PAINLEVEL_OUTOF10: NO PAIN (0)

## 2025-02-04 NOTE — NURSING NOTE
Current patient location: [facility]    Is the patient currently in the state of [place]? YES    Visit mode: VIDEO    If the visit is dropped, the patient can be reconnected by:VIDEO VISIT: Send to e-mail at: [email or web address]    Will anyone else be joining the visit? YES: How would they like to receive their invitation? Text to cell phone: [phone]  (If patient encounters technical issues they should call [phone])    Are changes needed to the allergy or medication list? Pt stated no changes to allergies and Pt stated no med changes    Are refills needed on medications prescribed by this physician? NO    Rooming Documentation:  Questionnaire(s) not done per department protocol    Reason for visit: Consult    Elise YEN

## 2025-02-07 ENCOUNTER — PREP FOR PROCEDURE (OUTPATIENT)
Dept: ONCOLOGY | Facility: CLINIC | Age: 35
End: 2025-02-07

## 2025-02-07 ENCOUNTER — PATIENT OUTREACH (OUTPATIENT)
Dept: ONCOLOGY | Facility: CLINIC | Age: 35
End: 2025-02-07

## 2025-02-07 DIAGNOSIS — D13.4 HEPATOCELLULAR ADENOMA: Primary | ICD-10-CM

## 2025-02-07 NOTE — PROGRESS NOTES
[facility]: Surgical Oncology Plan of Care Education Note                                     Discussion with Patient:                                                       Spoke with Patric following his visit with Dr. Lazo on 2/4/2025. Introduced self and explained role of RNCC. Ivan stated he has decided to move forward with surgery with Dr. Lazo. He is requesting surgery in mid-late March due to his work schedule.        Plan:                                                       Reviewed plan for open partial central hepatectomy and cholecystectomy at the [facility]. Discussed need for H&P within 30 days of surgery. Patric will be scheduled for a PAC visit.      Patric will be scheduled for a CT scan and labs prior to surgery.     Informed patient they should get a call within the next three business days from our OR  with surgery date, H&P plan and date of post-op visit with their surgeon. Writer answered all questions and concerns to the best of her ability and provided her contact information. Reviewed use of [employer] as alternative way to contact team.  Encouraged patient to contact with questions.         Kathe Acuna, RNCC  [facility]  Surgical Onolcogy      Approximately 15 minutes was spent in discussion with patient.

## 2025-02-10 ENCOUNTER — VIRTUAL VISIT (OUTPATIENT)
Dept: FAMILY MEDICINE | Facility: CLINIC | Age: 35
End: 2025-02-10
Payer: COMMERCIAL

## 2025-02-10 ENCOUNTER — TELEPHONE (OUTPATIENT)
Dept: SURGERY | Facility: CLINIC | Age: 35
End: 2025-02-10

## 2025-02-10 DIAGNOSIS — D13.4 HEPATOCELLULAR ADENOMA: ICD-10-CM

## 2025-02-10 DIAGNOSIS — F41.1 GENERALIZED ANXIETY DISORDER: Primary | ICD-10-CM

## 2025-02-10 RX ORDER — ESCITALOPRAM OXALATE 5 MG/1
TABLET ORAL
Qty: 60 TABLET | Refills: 1 | Status: SHIPPED | OUTPATIENT
Start: 2025-02-10

## 2025-02-10 NOTE — TELEPHONE ENCOUNTER
Called patient to schedule surgery with Dr. Lazo    Spoke with:  patient    Date of Surgery: 4/2/2025    Arrival time Discussed with Patient:  No    Location of surgery: [facility] [facility]     Pre-Op H&P: PAC in person, 3[place]21/25, 7:00 AM[place] [facility]    [place]abs: 3/21/25, 9:00 AM, [facility]    Post-Op Appts: 4/22/25, 8:15 AM, [facility]     Imaging:  Yes - CT  Scheduled: 3/21/25, 8:40 AM, [facility]     Discussed with patient PAC RN will provide arrival time and instructions for surgery at the time of the appointment: [[place] locations only]: Yes    Packet sent out: Yes  via [employer] Message [DATE] 2/11/25      Informed patient that they will need an adult  to bring patient home from surgery: Not Applicable  : surgery admit       Additional Comments:  After initial scheduling, patient called back and inquired about surgery 3/19, 3/20, or 3/21. Writer stated they will inquire and will call patient back 2/11 with more information.      All patients questions were answered and patient was instructed to review surgical packet and call back with any questions or concerns.       Gregorio Ocasio on 2/10/2025 at 3:53 PM

## 2025-02-10 NOTE — TELEPHONE ENCOUNTER
Left voicemail for patient regarding scheduling surgery with Dr. Lazo.    Left call back [phone]    Gregorio Ocasio on 2/10/2025 at 2:10 PM

## 2025-02-10 NOTE — PROGRESS NOTES
Patric is a 34 year old who is being evaluated via a billable video visit.          Assessment & Plan     Generalized anxiety disorder  Longstanding, suboptimal control. Reviewed med options, SE in detail. Can consider augmentation w/ buspirone or bupropion if bothersome sexual SE   - escitalopram (LEXAPRO) 5 MG tablet; Take 1 tablet (5mg) once daily. After 2 weeks, increase to 2 tablets (10mg) daily.    Hepatocellular adenoma   Pursuing surgical resection, is connected to specialists         Return in about 2 months (around 4/10/2025) for Follow up, with me.    Subjective   Patric is a 34 year old, presenting for the following health issues:  Follow Up (Medications )        2/10/2025     9:10 AM   Additional Questions   Roomed by Raimundo         2/10/2025    Information    services provided? No     HPI     Wanted to follow up about the antidepressant and talk about trialing that   Thinks needs some extra help - working w/ therapist, schedules not aligning, intake on Weds     Anxiety more than depression   Mostly just anxiety, hard to tell if both   Therapist talk about it through the lens of OCD as well. Gets really fixated, some compulsions   A lot of medical anxiety and spiraling   Feels tense body anxiety, compulsive thoughts that are really hard to break out of sometimes - take a lot of energy   - a lot of AFAB people in his life on Lexapro     Sleep: occasionally days where really fixated, can fall asleep w/o a lot of difficulty but if anxious earlier will wake up at 3am and not be able to go back to sleep. 6.5-8 hours/night           Objective           Vitals:  No vitals were obtained today due to virtual visit.    Physical Exam   GENERAL: alert and no distress  EYES: Eyes grossly normal to inspection.  No discharge or erythema, or obvious scleral/conjunctival abnormalities.  RESP: No audible wheeze, cough, or visible cyanosis.    SKIN: Visible skin clear. No significant rash, abnormal  pigmentation or lesions.  NEURO: Cranial nerves grossly intact.  Mentation and speech appropriate for age.  PSYCH: Appropriate affect, tone, and pace of words          Video-Visit Details    Type of service:  Video Visit   Originating Location (pt. Location): Home    Distant Location (provider location):  On-site  Platform used for Video Visit: Bharat  Signed Electronically by: Nadya Pimentel DO

## 2025-02-11 NOTE — TELEPHONE ENCOUNTER
FUTURE VISIT INFORMATION      SURGERY INFORMATION:  Date: 4/2/25  Location: [facility] OR  Surgeon:  Brian Lazo MD   Anesthesia Type:  general  Procedure: OPEN PARTIAL CENTRAL HEPATECTOMY CHOLECYSTECTOMY, INTRAOPERATIVE ULTRASOUND   Consult: virtual visit 2/4/25    RECORDS REQUESTED FROM:       Primary Care Provider: Emerald Johnson MD - [facility] Internal Medicine

## 2025-02-11 NOTE — TELEPHONE ENCOUNTER
Called patient to schedule surgery with Dr. Lazo    Spoke with:  patient    Date of Surgery: 3/19/2025    Arrival time Discussed with Patient:  No    Location of surgery: [facility] [facility]     Pre-Op H&P: PAC in person, 3/3/25, 8:00 AM, [facility]    Labs: 3/3/25, 9:15 AM, [facility]    Post-Op Appts: 4/8/25, 8:15 AM, [facility]     Imaging:  Yes - CT  Scheduled: 3/3/25, 10:00 AM, [facility]     Discussed with patient PAC RN will provide arrival time and instructions for surgery at the time of the appointment: [[place] locations only]: Yes    Packet sent out: Yes  via [employer] Message [DATE] 2/11/25      Informed patient that they will need an adult  to bring patient home from surgery: Not Applicable  : surgery admit       All patients questions were answered and patient was instructed to review surgical packet and call back with any questions or concerns.       Gregorio Ocasio on 2/11/2025 at 2:53 PM

## 2025-02-12 NOTE — TELEPHONE ENCOUNTER
FUTURE VISIT INFORMATION        SURGERY INFORMATION:  Date: 3/19/25  Location: [facility] OR  Surgeon:  Brian Lazo MD   Anesthesia Type:  general  Procedure: OPEN PARTIAL CENTRAL HEPATECTOMY CHOLECYSTECTOMY, INTRAOPERATIVE ULTRASOUND   Consult: virtual visit 2/4/25     RECORDS REQUESTED FROM:         Primary Care Provider: Emerald Johnson MD - [facility] Internal Medicine

## 2025-02-14 ENCOUNTER — TELEPHONE (OUTPATIENT)
Dept: SURGERY | Facility: CLINIC | Age: 35
End: 2025-02-14
Payer: COMMERCIAL

## 2025-02-14 NOTE — TELEPHONE ENCOUNTER
FMLA forms received via EMAIL from [email or web address].      Forms to be completed and put in folder for provider to approve.    Email back to patient: [email or web address]    Candace Astudillo

## 2025-02-14 NOTE — TELEPHONE ENCOUNTER
FMLA forms filled out and put in providers folder for review and signature.      Candace Astudillo

## 2025-02-18 NOTE — TELEPHONE ENCOUNTER
LUCIA paperwork completed, checked for accuracy, signed and emailed to patient @ [email or web address]. A copy was made, sent to scanning and original emailed to patient.    Jami Mercedes

## 2025-03-03 ENCOUNTER — PRE VISIT (OUTPATIENT)
Dept: SURGERY | Facility: CLINIC | Age: 35
End: 2025-03-03

## 2025-03-03 NOTE — TELEPHONE ENCOUNTER
FUTURE VISIT INFORMATION        SURGERY INFORMATION:  Date: 3/19/25  Location: [facility] OR  Surgeon:  Brian Lazo MD   Anesthesia Type:  general  Procedure: OPEN PARTIAL CENTRAL HEPATECTOMY CHOLECYSTECTOMY, INTRAOPERATIVE ULTRASOUND   Consult: virtual visit 2/4/25     RECORDS REQUESTED FROM:         Primary Care Provider: Emerald Johnson MD - [facility] Internal Medicine

## 2025-03-12 ENCOUNTER — TELEPHONE (OUTPATIENT)
Dept: FAMILY MEDICINE | Facility: CLINIC | Age: 35
End: 2025-03-12

## 2025-03-12 ENCOUNTER — VIRTUAL VISIT (OUTPATIENT)
Dept: FAMILY MEDICINE | Facility: CLINIC | Age: 35
End: 2025-03-12
Payer: COMMERCIAL

## 2025-03-12 DIAGNOSIS — I10 BENIGN ESSENTIAL HYPERTENSION: ICD-10-CM

## 2025-03-12 DIAGNOSIS — U07.1 INFECTION DUE TO 2019 NOVEL CORONAVIRUS: Primary | ICD-10-CM

## 2025-03-12 RX ORDER — AMLODIPINE BESYLATE 5 MG/1
5 TABLET ORAL DAILY
Qty: 90 TABLET | Refills: 3 | Status: SHIPPED | OUTPATIENT
Start: 2025-03-12

## 2025-03-12 NOTE — PROGRESS NOTES
Patric is a 34 year old who is being evaluated via a billable video visit.          Patric was seen today for covid concern.    Diagnoses and all orders for this visit:    Infection due to 2019 novel coronavirus.  He is on day 3 of COVID with malaise, congestion, and cough.  No shortness of breath.  This is the third time he has had COVID, with his last infection in May 2023.  He was scheduled to undergo resection of a hepatoma this month but informs me that the surgery has been postponed to April.  He is asking whether or not he should take Paxlovid.  I reviewed with him the evidence regarding Paxlovid and current recommendations that he be considered for patient's over the age of 65 or who had immunocompromise state.  Informing the patient that he has neither of those, I did not recommend that he take Paxlovid.  Furthermore, he has also had a recent very significant bout with norovirus which caused excessive diarrhea and weight loss.  I informed him that Paxlovid does have side effects of diarrhea and I was concerned that should he take it that it could potentially exacerbate those symptoms as well.  He will treat COVID conservatively and agreed not to take Paxlovid.    Benign essential hypertension.  This is stable and he will continue amlodipine.  -     amLODIPine (NORVASC) 5 MG tablet; Take 1 tablet (5 mg) by mouth daily.          Subjective   Patric is a 34 year old, presenting for the following health issues:  Covid Concern (Paxlovid )      3/12/2025     3:52 PM   Additional Questions   Roomed by Raimundo         3/12/2025    Information    services provided? No     HPI        Video visit with a chief complaint of COVID and questions regarding COVID management.          Objective           Vitals:  No vitals were obtained today due to virtual visit.    Physical Exam   GENERAL: alert and no distress  EYES: Eyes grossly normal to inspection.  No discharge or erythema, or obvious scleral/conjunctival  abnormalities.  RESP: No audible wheeze, cough, or visible cyanosis.    SKIN: Visible skin clear. No significant rash, abnormal pigmentation or lesions.  NEURO: Cranial nerves grossly intact.  Mentation and speech appropriate for age.  PSYCH: Appropriate affect, tone, and pace of words    Lab on 01/24/2025   Component Date Value Ref Range Status    WBC Count 01/24/2025 5.0  4.0 - 11.0 10e3/uL Final    RBC Count 01/24/2025 5.16  4.40 - 5.90 10e6/uL Final    Hemoglobin 01/24/2025 15.0  13.3 - 17.7 g/dL Final    Hematocrit 01/24/2025 44.5  40.0 - 53.0 % Final    MCV 01/24/2025 86  78 - 100 fL Final    MCH 01/24/2025 29.1  26.5 - 33.0 pg Final    MCHC 01/24/2025 33.7  31.5 - 36.5 g/dL Final    RDW 01/24/2025 12.6  10.0 - 15.0 % Final    Platelet Count 01/24/2025 198  150 - 450 10e3/uL Final    Sodium 01/24/2025 139  135 - 145 mmol/L Final    Potassium 01/24/2025 4.2  3.4 - 5.3 mmol/L Final    Carbon Dioxide (CO2) 01/24/2025 26  22 - 29 mmol/L Final    Anion Gap 01/24/2025 9  7 - 15 mmol/L Final    Urea Nitrogen 01/24/2025 17.2  6.0 - 20.0 mg/dL Final    Creatinine 01/24/2025 0.86  0.67 - 1.17 mg/dL Final    GFR Estimate 01/24/2025 >90  >60 mL/min/1.73m2 Final    eGFR calculated using 2021 CKD-EPI equation.    Calcium 01/24/2025 9.3  8.8 - 10.4 mg/dL Final    Reference intervals for this test were updated on 7/16/2024 to reflect our healthy population more accurately. There may be differences in the flagging of prior results with similar values performed with this method. Those prior results can be interpreted in the context of the updated reference intervals.    Chloride 01/24/2025 104  98 - 107 mmol/L Final    Glucose 01/24/2025 87  70 - 99 mg/dL Final    Alkaline Phosphatase 01/24/2025 62  40 - 150 U/L Final    AST 01/24/2025 23  0 - 45 U/L Final    ALT 01/24/2025 20  0 - 70 U/L Final    Protein Total 01/24/2025 7.0  6.4 - 8.3 g/dL Final    Albumin 01/24/2025 4.6  3.5 - 5.2 g/dL Final    Bilirubin Total 01/24/2025 0.4   <=1.2 mg/dL Final    INR 01/24/2025 1.08  0.85 - 1.15 Final         Video-Visit Details    Type of service:  Video Visit   Originating Location (pt. Location): Home    Distant Location (provider location):  On-site  Platform used for Video Visit: Bharat  Signed Electronically by: Johnathon Isabel MD

## 2025-03-13 NOTE — TELEPHONE ENCOUNTER
FUTURE VISIT INFORMATION        SURGERY INFORMATION:  Date: 4/2/25  Location: [facility] OR  Surgeon:  Brian Lazo MD   Anesthesia Type:  general  Procedure: OPEN PARTIAL CENTRAL HEPATECTOMY CHOLECYSTECTOMY, INTRAOPERATIVE ULTRASOUND   Consult: virtual visit 2/4/25     RECORDS REQUESTED FROM:         Primary Care Provider: Emerald Johnson MD - [facility] Internal Medicine

## 2025-03-14 ENCOUNTER — PRE VISIT (OUTPATIENT)
Dept: SURGERY | Facility: CLINIC | Age: 35
End: 2025-03-14

## 2025-03-18 ENCOUNTER — TELEPHONE (OUTPATIENT)
Dept: SURGERY | Facility: CLINIC | Age: 35
End: 2025-03-18
Payer: COMMERCIAL

## 2025-03-18 NOTE — TELEPHONE ENCOUNTER
FMLA forms received via email from patient.      Forms to be completed and put in folder for provider to approve.    Return to patient at: [email or web address]    Deena Mercedes

## 2025-03-21 ENCOUNTER — PRE VISIT (OUTPATIENT)
Dept: SURGERY | Facility: CLINIC | Age: 35
End: 2025-03-21

## 2025-03-28 ENCOUNTER — APPOINTMENT (OUTPATIENT)
Dept: LAB | Facility: CLINIC | Age: 35
End: 2025-03-28
Payer: COMMERCIAL

## 2025-03-28 ENCOUNTER — PRE VISIT (OUTPATIENT)
Dept: SURGERY | Facility: CLINIC | Age: 35
End: 2025-03-28

## 2025-03-28 ENCOUNTER — ANCILLARY PROCEDURE (OUTPATIENT)
Dept: CT IMAGING | Facility: CLINIC | Age: 35
End: 2025-03-28
Attending: SURGERY
Payer: COMMERCIAL

## 2025-03-28 ENCOUNTER — ANESTHESIA EVENT (OUTPATIENT)
Dept: SURGERY | Facility: CLINIC | Age: 35
End: 2025-03-28
Payer: COMMERCIAL

## 2025-03-28 DIAGNOSIS — D13.4 HEPATOCELLULAR ADENOMA: ICD-10-CM

## 2025-03-28 PROCEDURE — 74178 CT ABD&PLV WO CNTR FLWD CNTR: CPT | Performed by: RADIOLOGY

## 2025-03-28 PROCEDURE — 99000 SPECIMEN HANDLING OFFICE-LAB: CPT | Performed by: PATHOLOGY

## 2025-03-28 PROCEDURE — 84134 ASSAY OF PREALBUMIN: CPT | Performed by: SURGERY

## 2025-03-28 RX ORDER — IOPAMIDOL 755 MG/ML
89 INJECTION, SOLUTION INTRAVASCULAR ONCE
Status: COMPLETED | OUTPATIENT
Start: 2025-03-28 | End: 2025-03-28

## 2025-03-28 RX ADMIN — IOPAMIDOL 89 ML: 755 INJECTION, SOLUTION INTRAVASCULAR at 08:25

## 2025-04-09 ENCOUNTER — ANESTHESIA (OUTPATIENT)
Dept: SURGERY | Facility: CLINIC | Age: 35
End: 2025-04-09
Payer: COMMERCIAL

## 2025-04-09 ENCOUNTER — HOSPITAL ENCOUNTER (INPATIENT)
Facility: CLINIC | Age: 35
End: 2025-04-09
Attending: SURGERY | Admitting: SURGERY
Payer: COMMERCIAL

## 2025-04-09 DIAGNOSIS — D13.4 HEPATIC ADENOMA: ICD-10-CM

## 2025-04-09 DIAGNOSIS — R10.2 PELVIC PAIN IN MALE: ICD-10-CM

## 2025-04-09 DIAGNOSIS — G89.18 POSTOPERATIVE PAIN: Primary | ICD-10-CM

## 2025-04-09 DIAGNOSIS — F41.1 GENERALIZED ANXIETY DISORDER: ICD-10-CM

## 2025-04-09 LAB
ALBUMIN SERPL BCG-MCNC: 4.4 G/DL (ref 3.5–5.2)
ALP SERPL-CCNC: 50 U/L (ref 40–150)
ALT SERPL W P-5'-P-CCNC: 213 U/L (ref 0–70)
ANION GAP SERPL CALCULATED.3IONS-SCNC: 14 MMOL/L (ref 7–15)
AST SERPL W P-5'-P-CCNC: 204 U/L (ref 0–45)
BILIRUB SERPL-MCNC: 0.9 MG/DL
BLD PROD TYP BPU: NORMAL
BLD PROD TYP BPU: NORMAL
BLOOD COMPONENT TYPE: NORMAL
BLOOD COMPONENT TYPE: NORMAL
BUN SERPL-MCNC: 13.3 MG/DL (ref 6–20)
CALCIUM SERPL-MCNC: 8.8 MG/DL (ref 8.8–10.4)
CHLORIDE SERPL-SCNC: 102 MMOL/L (ref 98–107)
CODING SYSTEM: NORMAL
CODING SYSTEM: NORMAL
CREAT SERPL-MCNC: 0.89 MG/DL (ref 0.67–1.17)
CROSSMATCH: NORMAL
CROSSMATCH: NORMAL
EGFRCR SERPLBLD CKD-EPI 2021: >90 ML/MIN/1.73M2
ERYTHROCYTE [DISTWIDTH] IN BLOOD BY AUTOMATED COUNT: 12.8 % (ref 10–15)
GLUCOSE BLDC GLUCOMTR-MCNC: 95 MG/DL (ref 70–99)
GLUCOSE SERPL-MCNC: 156 MG/DL (ref 70–99)
HCO3 SERPL-SCNC: 22 MMOL/L (ref 22–29)
HCT VFR BLD AUTO: 43.6 % (ref 40–53)
HGB BLD-MCNC: 14.3 G/DL (ref 13.3–17.7)
INR PPP: 1.12 (ref 0.85–1.15)
ISSUE DATE AND TIME: NORMAL
ISSUE DATE AND TIME: NORMAL
MCH RBC QN AUTO: 28.8 PG (ref 26.5–33)
MCHC RBC AUTO-ENTMCNC: 32.8 G/DL (ref 31.5–36.5)
MCV RBC AUTO: 88 FL (ref 78–100)
PHOSPHATE SERPL-MCNC: 3.8 MG/DL (ref 2.5–4.5)
PLATELET # BLD AUTO: 174 10E3/UL (ref 150–450)
POTASSIUM SERPL-SCNC: 4.2 MMOL/L (ref 3.4–5.3)
PROT SERPL-MCNC: 6.5 G/DL (ref 6.4–8.3)
RBC # BLD AUTO: 4.96 10E6/UL (ref 4.4–5.9)
SODIUM SERPL-SCNC: 138 MMOL/L (ref 135–145)
UNIT ABO/RH: NORMAL
UNIT ABO/RH: NORMAL
UNIT NUMBER: NORMAL
UNIT NUMBER: NORMAL
UNIT STATUS: NORMAL
UNIT STATUS: NORMAL
UNIT TYPE ISBT: 5100
UNIT TYPE ISBT: 5100
WBC # BLD AUTO: 11.4 10E3/UL (ref 4–11)

## 2025-04-09 PROCEDURE — 0FB20ZX EXCISION OF LEFT LOBE LIVER, OPEN APPROACH, DIAGNOSTIC: ICD-10-PCS | Performed by: SURGERY

## 2025-04-09 PROCEDURE — 250N000011 HC RX IP 250 OP 636: Mod: JZ

## 2025-04-09 PROCEDURE — 250N000009 HC RX 250: Performed by: ANESTHESIOLOGY

## 2025-04-09 PROCEDURE — 250N000009 HC RX 250: Performed by: SURGERY

## 2025-04-09 PROCEDURE — 82040 ASSAY OF SERUM ALBUMIN: CPT | Performed by: STUDENT IN AN ORGANIZED HEALTH CARE EDUCATION/TRAINING PROGRAM

## 2025-04-09 PROCEDURE — 36415 COLL VENOUS BLD VENIPUNCTURE: CPT | Performed by: STUDENT IN AN ORGANIZED HEALTH CARE EDUCATION/TRAINING PROGRAM

## 2025-04-09 PROCEDURE — 360N000078 HC SURGERY LEVEL 5, PER MIN: Performed by: SURGERY

## 2025-04-09 PROCEDURE — 272N000001 HC OR GENERAL SUPPLY STERILE: Performed by: SURGERY

## 2025-04-09 PROCEDURE — 999N000141 HC STATISTIC PRE-PROCEDURE NURSING ASSESSMENT: Performed by: SURGERY

## 2025-04-09 PROCEDURE — 250N000011 HC RX IP 250 OP 636

## 2025-04-09 PROCEDURE — 82247 BILIRUBIN TOTAL: CPT | Performed by: STUDENT IN AN ORGANIZED HEALTH CARE EDUCATION/TRAINING PROGRAM

## 2025-04-09 PROCEDURE — 0FT40ZZ RESECTION OF GALLBLADDER, OPEN APPROACH: ICD-10-PCS | Performed by: SURGERY

## 2025-04-09 PROCEDURE — 250N000025 HC SEVOFLURANE, PER MIN: Performed by: SURGERY

## 2025-04-09 PROCEDURE — 250N000011 HC RX IP 250 OP 636: Performed by: ANESTHESIOLOGY

## 2025-04-09 PROCEDURE — 88307 TISSUE EXAM BY PATHOLOGIST: CPT | Mod: 26 | Performed by: PATHOLOGY

## 2025-04-09 PROCEDURE — 250N000011 HC RX IP 250 OP 636: Performed by: STUDENT IN AN ORGANIZED HEALTH CARE EDUCATION/TRAINING PROGRAM

## 2025-04-09 PROCEDURE — 88341 IMHCHEM/IMCYTCHM EA ADD ANTB: CPT | Mod: 26 | Performed by: PATHOLOGY

## 2025-04-09 PROCEDURE — 85610 PROTHROMBIN TIME: CPT | Performed by: STUDENT IN AN ORGANIZED HEALTH CARE EDUCATION/TRAINING PROGRAM

## 2025-04-09 PROCEDURE — 258N000003 HC RX IP 258 OP 636: Performed by: ANESTHESIOLOGY

## 2025-04-09 PROCEDURE — P9045 ALBUMIN (HUMAN), 5%, 250 ML: HCPCS | Performed by: ANESTHESIOLOGY

## 2025-04-09 PROCEDURE — 88341 IMHCHEM/IMCYTCHM EA ADD ANTB: CPT | Mod: 26 | Performed by: SURGERY

## 2025-04-09 PROCEDURE — 120N000002 HC R&B MED SURG/OB UMMC

## 2025-04-09 PROCEDURE — 86923 COMPATIBILITY TEST ELECTRIC: CPT

## 2025-04-09 PROCEDURE — 710N000010 HC RECOVERY PHASE 1, LEVEL 2, PER MIN: Performed by: SURGERY

## 2025-04-09 PROCEDURE — 85014 HEMATOCRIT: CPT | Performed by: STUDENT IN AN ORGANIZED HEALTH CARE EDUCATION/TRAINING PROGRAM

## 2025-04-09 PROCEDURE — 88313 SPECIAL STAINS GROUP 2: CPT | Mod: 26 | Performed by: PATHOLOGY

## 2025-04-09 PROCEDURE — 76998 US GUIDE INTRAOP: CPT | Mod: 26 | Performed by: SURGERY

## 2025-04-09 PROCEDURE — 271N000002 HC RX 271

## 2025-04-09 PROCEDURE — 88341 IMHCHEM/IMCYTCHM EA ADD ANTB: CPT | Mod: TC | Performed by: SURGERY

## 2025-04-09 PROCEDURE — 250N000011 HC RX IP 250 OP 636: Mod: JZ | Performed by: ANESTHESIOLOGY

## 2025-04-09 PROCEDURE — 370N000017 HC ANESTHESIA TECHNICAL FEE, PER MIN: Performed by: SURGERY

## 2025-04-09 PROCEDURE — 88342 IMHCHEM/IMCYTCHM 1ST ANTB: CPT | Mod: 26 | Performed by: PATHOLOGY

## 2025-04-09 PROCEDURE — 0FB10ZX EXCISION OF RIGHT LOBE LIVER, OPEN APPROACH, DIAGNOSTIC: ICD-10-PCS | Performed by: SURGERY

## 2025-04-09 PROCEDURE — 47120 PARTIAL REMOVAL OF LIVER: CPT | Mod: GC | Performed by: SURGERY

## 2025-04-09 PROCEDURE — 84100 ASSAY OF PHOSPHORUS: CPT | Performed by: STUDENT IN AN ORGANIZED HEALTH CARE EDUCATION/TRAINING PROGRAM

## 2025-04-09 PROCEDURE — 258N000003 HC RX IP 258 OP 636: Performed by: STUDENT IN AN ORGANIZED HEALTH CARE EDUCATION/TRAINING PROGRAM

## 2025-04-09 PROCEDURE — P9016 RBC LEUKOCYTES REDUCED: HCPCS

## 2025-04-09 RX ORDER — ESCITALOPRAM OXALATE 10 MG/1
10 TABLET ORAL EVERY MORNING
Status: DISCONTINUED | OUTPATIENT
Start: 2025-04-10 | End: 2025-04-14 | Stop reason: HOSPADM

## 2025-04-09 RX ORDER — ONDANSETRON 2 MG/ML
4 INJECTION INTRAMUSCULAR; INTRAVENOUS EVERY 30 MIN PRN
Status: DISCONTINUED | OUTPATIENT
Start: 2025-04-09 | End: 2025-04-09 | Stop reason: HOSPADM

## 2025-04-09 RX ORDER — HEPARIN SODIUM 1000 [USP'U]/ML
INJECTION, SOLUTION INTRAVENOUS; SUBCUTANEOUS PRN
Status: DISCONTINUED | OUTPATIENT
Start: 2025-04-09 | End: 2025-04-09

## 2025-04-09 RX ORDER — EPHEDRINE SULFATE 50 MG/ML
INJECTION, SOLUTION INTRAMUSCULAR; INTRAVENOUS; SUBCUTANEOUS PRN
Status: DISCONTINUED | OUTPATIENT
Start: 2025-04-09 | End: 2025-04-09

## 2025-04-09 RX ORDER — FENTANYL CITRATE 50 UG/ML
INJECTION, SOLUTION INTRAMUSCULAR; INTRAVENOUS PRN
Status: DISCONTINUED | OUTPATIENT
Start: 2025-04-09 | End: 2025-04-09

## 2025-04-09 RX ORDER — LIDOCAINE HYDROCHLORIDE 20 MG/ML
INJECTION, SOLUTION INFILTRATION; PERINEURAL PRN
Status: DISCONTINUED | OUTPATIENT
Start: 2025-04-09 | End: 2025-04-09

## 2025-04-09 RX ORDER — NALOXONE HYDROCHLORIDE 0.4 MG/ML
0.2 INJECTION, SOLUTION INTRAMUSCULAR; INTRAVENOUS; SUBCUTANEOUS
Status: DISCONTINUED | OUTPATIENT
Start: 2025-04-09 | End: 2025-04-09 | Stop reason: HOSPADM

## 2025-04-09 RX ORDER — NALOXONE HYDROCHLORIDE 0.4 MG/ML
0.4 INJECTION, SOLUTION INTRAMUSCULAR; INTRAVENOUS; SUBCUTANEOUS
Status: DISCONTINUED | OUTPATIENT
Start: 2025-04-09 | End: 2025-04-14 | Stop reason: HOSPADM

## 2025-04-09 RX ORDER — KETAMINE HYDROCHLORIDE 10 MG/ML
INJECTION INTRAMUSCULAR; INTRAVENOUS PRN
Status: DISCONTINUED | OUTPATIENT
Start: 2025-04-09 | End: 2025-04-09

## 2025-04-09 RX ORDER — FENTANYL CITRATE 50 UG/ML
25-50 INJECTION, SOLUTION INTRAMUSCULAR; INTRAVENOUS
Status: DISCONTINUED | OUTPATIENT
Start: 2025-04-09 | End: 2025-04-09 | Stop reason: HOSPADM

## 2025-04-09 RX ORDER — ONDANSETRON 4 MG/1
4 TABLET, ORALLY DISINTEGRATING ORAL EVERY 30 MIN PRN
Status: DISCONTINUED | OUTPATIENT
Start: 2025-04-09 | End: 2025-04-09 | Stop reason: HOSPADM

## 2025-04-09 RX ORDER — NALOXONE HYDROCHLORIDE 0.4 MG/ML
0.4 INJECTION, SOLUTION INTRAMUSCULAR; INTRAVENOUS; SUBCUTANEOUS
Status: DISCONTINUED | OUTPATIENT
Start: 2025-04-09 | End: 2025-04-09 | Stop reason: HOSPADM

## 2025-04-09 RX ORDER — NALOXONE HYDROCHLORIDE 0.4 MG/ML
0.2 INJECTION, SOLUTION INTRAMUSCULAR; INTRAVENOUS; SUBCUTANEOUS
Status: DISCONTINUED | OUTPATIENT
Start: 2025-04-09 | End: 2025-04-14 | Stop reason: HOSPADM

## 2025-04-09 RX ORDER — BUPIVACAINE HYDROCHLORIDE AND EPINEPHRINE 2.5; 5 MG/ML; UG/ML
INJECTION, SOLUTION INFILTRATION; PERINEURAL PRN
Status: DISCONTINUED | OUTPATIENT
Start: 2025-04-09 | End: 2025-04-09 | Stop reason: HOSPADM

## 2025-04-09 RX ORDER — PROCHLORPERAZINE MALEATE 5 MG/1
10 TABLET ORAL EVERY 6 HOURS PRN
Status: DISCONTINUED | OUTPATIENT
Start: 2025-04-09 | End: 2025-04-10

## 2025-04-09 RX ORDER — SODIUM CHLORIDE, SODIUM LACTATE, POTASSIUM CHLORIDE, CALCIUM CHLORIDE 600; 310; 30; 20 MG/100ML; MG/100ML; MG/100ML; MG/100ML
INJECTION, SOLUTION INTRAVENOUS CONTINUOUS PRN
Status: DISCONTINUED | OUTPATIENT
Start: 2025-04-09 | End: 2025-04-09

## 2025-04-09 RX ORDER — ONDANSETRON 4 MG/1
4 TABLET, ORALLY DISINTEGRATING ORAL EVERY 6 HOURS PRN
Status: DISCONTINUED | OUTPATIENT
Start: 2025-04-09 | End: 2025-04-14 | Stop reason: HOSPADM

## 2025-04-09 RX ORDER — NALOXONE HYDROCHLORIDE 0.4 MG/ML
0.1 INJECTION, SOLUTION INTRAMUSCULAR; INTRAVENOUS; SUBCUTANEOUS
Status: DISCONTINUED | OUTPATIENT
Start: 2025-04-09 | End: 2025-04-09 | Stop reason: HOSPADM

## 2025-04-09 RX ORDER — FLUMAZENIL 0.1 MG/ML
0.2 INJECTION, SOLUTION INTRAVENOUS
Status: DISCONTINUED | OUTPATIENT
Start: 2025-04-09 | End: 2025-04-09 | Stop reason: HOSPADM

## 2025-04-09 RX ORDER — BUPIVACAINE HYDROCHLORIDE 2.5 MG/ML
INJECTION, SOLUTION EPIDURAL; INFILTRATION; INTRACAUDAL; PERINEURAL
Status: COMPLETED | OUTPATIENT
Start: 2025-04-09 | End: 2025-04-09

## 2025-04-09 RX ORDER — PROPOFOL 10 MG/ML
INJECTION, EMULSION INTRAVENOUS PRN
Status: DISCONTINUED | OUTPATIENT
Start: 2025-04-09 | End: 2025-04-09

## 2025-04-09 RX ORDER — CEFAZOLIN SODIUM/WATER 2 G/20 ML
2 SYRINGE (ML) INTRAVENOUS SEE ADMIN INSTRUCTIONS
Status: DISCONTINUED | OUTPATIENT
Start: 2025-04-09 | End: 2025-04-09 | Stop reason: HOSPADM

## 2025-04-09 RX ORDER — SODIUM CHLORIDE, SODIUM LACTATE, POTASSIUM CHLORIDE, CALCIUM CHLORIDE 600; 310; 30; 20 MG/100ML; MG/100ML; MG/100ML; MG/100ML
INJECTION, SOLUTION INTRAVENOUS CONTINUOUS
Status: DISCONTINUED | OUTPATIENT
Start: 2025-04-09 | End: 2025-04-10

## 2025-04-09 RX ORDER — LIDOCAINE 40 MG/G
CREAM TOPICAL
Status: DISCONTINUED | OUTPATIENT
Start: 2025-04-09 | End: 2025-04-14 | Stop reason: HOSPADM

## 2025-04-09 RX ORDER — FENTANYL CITRATE 50 UG/ML
50 INJECTION, SOLUTION INTRAMUSCULAR; INTRAVENOUS EVERY 5 MIN PRN
Status: DISCONTINUED | OUTPATIENT
Start: 2025-04-09 | End: 2025-04-09 | Stop reason: HOSPADM

## 2025-04-09 RX ORDER — HYDROMORPHONE HCL IN WATER/PF 6 MG/30 ML
0.4 PATIENT CONTROLLED ANALGESIA SYRINGE INTRAVENOUS EVERY 5 MIN PRN
Status: DISCONTINUED | OUTPATIENT
Start: 2025-04-09 | End: 2025-04-09 | Stop reason: HOSPADM

## 2025-04-09 RX ORDER — SODIUM CHLORIDE, SODIUM LACTATE, POTASSIUM CHLORIDE, CALCIUM CHLORIDE 600; 310; 30; 20 MG/100ML; MG/100ML; MG/100ML; MG/100ML
INJECTION, SOLUTION INTRAVENOUS CONTINUOUS
Status: DISCONTINUED | OUTPATIENT
Start: 2025-04-09 | End: 2025-04-09 | Stop reason: HOSPADM

## 2025-04-09 RX ORDER — ONDANSETRON 2 MG/ML
INJECTION INTRAMUSCULAR; INTRAVENOUS PRN
Status: DISCONTINUED | OUTPATIENT
Start: 2025-04-09 | End: 2025-04-09

## 2025-04-09 RX ORDER — SODIUM CHLORIDE, SODIUM GLUCONATE, SODIUM ACETATE, POTASSIUM CHLORIDE AND MAGNESIUM CHLORIDE 526; 502; 368; 37; 30 MG/100ML; MG/100ML; MG/100ML; MG/100ML; MG/100ML
INJECTION, SOLUTION INTRAVENOUS CONTINUOUS PRN
Status: DISCONTINUED | OUTPATIENT
Start: 2025-04-09 | End: 2025-04-09

## 2025-04-09 RX ORDER — DEXAMETHASONE SODIUM PHOSPHATE 4 MG/ML
INJECTION, SOLUTION INTRA-ARTICULAR; INTRALESIONAL; INTRAMUSCULAR; INTRAVENOUS; SOFT TISSUE PRN
Status: DISCONTINUED | OUTPATIENT
Start: 2025-04-09 | End: 2025-04-09

## 2025-04-09 RX ORDER — ONDANSETRON 2 MG/ML
4 INJECTION INTRAMUSCULAR; INTRAVENOUS EVERY 6 HOURS PRN
Status: DISCONTINUED | OUTPATIENT
Start: 2025-04-09 | End: 2025-04-14 | Stop reason: HOSPADM

## 2025-04-09 RX ORDER — PROPOFOL 10 MG/ML
INJECTION, EMULSION INTRAVENOUS CONTINUOUS PRN
Status: DISCONTINUED | OUTPATIENT
Start: 2025-04-09 | End: 2025-04-09

## 2025-04-09 RX ORDER — ENOXAPARIN SODIUM 100 MG/ML
40 INJECTION SUBCUTANEOUS
Status: DISCONTINUED | OUTPATIENT
Start: 2025-04-09 | End: 2025-04-09 | Stop reason: HOSPADM

## 2025-04-09 RX ADMIN — FENTANYL CITRATE 50 MCG: 50 INJECTION, SOLUTION INTRAMUSCULAR; INTRAVENOUS at 06:44

## 2025-04-09 RX ADMIN — LIDOCAINE HYDROCHLORIDE 100 MG: 20 INJECTION, SOLUTION INFILTRATION; PERINEURAL at 07:44

## 2025-04-09 RX ADMIN — Medication 100 MG: at 12:21

## 2025-04-09 RX ADMIN — MIDAZOLAM 1 MG: 1 INJECTION INTRAMUSCULAR; INTRAVENOUS at 06:53

## 2025-04-09 RX ADMIN — MIDAZOLAM 2 MG: 1 INJECTION INTRAMUSCULAR; INTRAVENOUS at 07:44

## 2025-04-09 RX ADMIN — Medication 10 MG: at 09:00

## 2025-04-09 RX ADMIN — HYDROMORPHONE HYDROCHLORIDE 0.5 MG: 1 INJECTION, SOLUTION INTRAMUSCULAR; INTRAVENOUS; SUBCUTANEOUS at 09:13

## 2025-04-09 RX ADMIN — Medication 30 MG: at 09:40

## 2025-04-09 RX ADMIN — Medication 20 MG: at 08:27

## 2025-04-09 RX ADMIN — HYDROMORPHONE HYDROCHLORIDE 0.4 MG: 0.2 INJECTION, SOLUTION INTRAMUSCULAR; INTRAVENOUS; SUBCUTANEOUS at 13:19

## 2025-04-09 RX ADMIN — FENTANYL CITRATE 50 MCG: 50 INJECTION, SOLUTION INTRAMUSCULAR; INTRAVENOUS at 13:03

## 2025-04-09 RX ADMIN — Medication 20 MG: at 09:16

## 2025-04-09 RX ADMIN — BUPIVACAINE HYDROCHLORIDE 10 ML: 2.5 INJECTION, SOLUTION EPIDURAL; INFILTRATION; INTRACAUDAL; PERINEURAL at 07:03

## 2025-04-09 RX ADMIN — PROPOFOL 120 MG: 10 INJECTION, EMULSION INTRAVENOUS at 07:44

## 2025-04-09 RX ADMIN — Medication: at 14:00

## 2025-04-09 RX ADMIN — Medication 10 MG: at 10:01

## 2025-04-09 RX ADMIN — SODIUM CHLORIDE, SODIUM LACTATE, POTASSIUM CHLORIDE, AND CALCIUM CHLORIDE: .6; .31; .03; .02 INJECTION, SOLUTION INTRAVENOUS at 16:41

## 2025-04-09 RX ADMIN — Medication 10 MG: at 11:19

## 2025-04-09 RX ADMIN — FENTANYL CITRATE 50 MCG: 50 INJECTION, SOLUTION INTRAMUSCULAR; INTRAVENOUS at 06:54

## 2025-04-09 RX ADMIN — Medication 2 G: at 08:14

## 2025-04-09 RX ADMIN — SODIUM CHLORIDE, SODIUM LACTATE, POTASSIUM CHLORIDE, AND CALCIUM CHLORIDE: .6; .31; .03; .02 INJECTION, SOLUTION INTRAVENOUS at 07:39

## 2025-04-09 RX ADMIN — EPHEDRINE SULFATE 10 MG: 5 INJECTION INTRAVENOUS at 09:25

## 2025-04-09 RX ADMIN — ALBUMIN HUMAN: 0.05 INJECTION, SOLUTION INTRAVENOUS at 11:33

## 2025-04-09 RX ADMIN — Medication 30 MG: at 07:44

## 2025-04-09 RX ADMIN — Medication 30 MG: at 08:38

## 2025-04-09 RX ADMIN — FENTANYL CITRATE 50 MCG: 50 INJECTION, SOLUTION INTRAMUSCULAR; INTRAVENOUS at 12:53

## 2025-04-09 RX ADMIN — ONDANSETRON 4 MG: 2 INJECTION INTRAMUSCULAR; INTRAVENOUS at 08:26

## 2025-04-09 RX ADMIN — DEXAMETHASONE SODIUM PHOSPHATE 4 MG: 4 INJECTION, SOLUTION INTRA-ARTICULAR; INTRALESIONAL; INTRAMUSCULAR; INTRAVENOUS; SOFT TISSUE at 08:20

## 2025-04-09 RX ADMIN — MIDAZOLAM 1 MG: 1 INJECTION INTRAMUSCULAR; INTRAVENOUS at 06:44

## 2025-04-09 RX ADMIN — EPHEDRINE SULFATE 5 MG: 5 INJECTION INTRAVENOUS at 10:03

## 2025-04-09 RX ADMIN — PROPOFOL 20 MCG/KG/MIN: 10 INJECTION, EMULSION INTRAVENOUS at 08:00

## 2025-04-09 RX ADMIN — Medication 10 ML/HR: at 08:55

## 2025-04-09 RX ADMIN — ONDANSETRON 4 MG: 2 INJECTION INTRAMUSCULAR; INTRAVENOUS at 11:52

## 2025-04-09 RX ADMIN — PHENYLEPHRINE HYDROCHLORIDE 0.3 MCG/KG/MIN: 10 INJECTION INTRAVENOUS at 08:33

## 2025-04-09 RX ADMIN — Medication 20 MG: at 10:15

## 2025-04-09 RX ADMIN — Medication 30 MG: at 10:43

## 2025-04-09 RX ADMIN — EPHEDRINE SULFATE 5 MG: 5 INJECTION INTRAVENOUS at 08:46

## 2025-04-09 RX ADMIN — FENTANYL CITRATE 100 MCG: 50 INJECTION INTRAMUSCULAR; INTRAVENOUS at 07:44

## 2025-04-09 RX ADMIN — HEPARIN SODIUM 1000 UNITS: 1000 INJECTION INTRAVENOUS; SUBCUTANEOUS at 08:21

## 2025-04-09 RX ADMIN — ONDANSETRON 4 MG: 4 TABLET, ORALLY DISINTEGRATING ORAL at 17:29

## 2025-04-09 RX ADMIN — Medication 2 G: at 12:14

## 2025-04-09 RX ADMIN — EPHEDRINE SULFATE 5 MG: 5 INJECTION INTRAVENOUS at 08:32

## 2025-04-09 RX ADMIN — Medication 80 MG: at 07:44

## 2025-04-09 RX ADMIN — SODIUM CHLORIDE, SODIUM GLUCONATE, SODIUM ACETATE, POTASSIUM CHLORIDE AND MAGNESIUM CHLORIDE: 526; 502; 368; 37; 30 INJECTION, SOLUTION INTRAVENOUS at 07:42

## 2025-04-09 RX ADMIN — HYDROMORPHONE HYDROCHLORIDE 0.4 MG: 0.2 INJECTION, SOLUTION INTRAMUSCULAR; INTRAVENOUS; SUBCUTANEOUS at 13:34

## 2025-04-09 RX ADMIN — PROCHLORPERAZINE EDISYLATE 5 MG: 5 INJECTION INTRAMUSCULAR; INTRAVENOUS at 12:54

## 2025-04-09 ASSESSMENT — ACTIVITIES OF DAILY LIVING (ADL)
ADLS_ACUITY_SCORE: 33
ADLS_ACUITY_SCORE: 39
ADLS_ACUITY_SCORE: 39
ADLS_ACUITY_SCORE: 33
ADLS_ACUITY_SCORE: 39
ADLS_ACUITY_SCORE: 39
ADLS_ACUITY_SCORE: 33
ADLS_ACUITY_SCORE: 39
ADLS_ACUITY_SCORE: 39
ADLS_ACUITY_SCORE: 33
ADLS_ACUITY_SCORE: 39
ADLS_ACUITY_SCORE: 33
ADLS_ACUITY_SCORE: 33
ADLS_ACUITY_SCORE: 39
ADLS_ACUITY_SCORE: 35
ADLS_ACUITY_SCORE: 39

## 2025-04-09 ASSESSMENT — LIFESTYLE VARIABLES: TOBACCO_USE: 0

## 2025-04-09 ASSESSMENT — ENCOUNTER SYMPTOMS: SEIZURES: 0

## 2025-04-09 NOTE — ANESTHESIA PREPROCEDURE EVALUATION
Anesthesia Pre-Procedure Evaluation    Patient: Ivan Goyal   MRN: [id number] : 1990        Procedure : Procedure(s):  OPEN PARTIAL CENTRAL HEPATECTOMY  CHOLECYSTECTOMY, INTRAOPERATIVE ULTRASOUND          Past Medical History:   Diagnosis Date     Anxiety      Hepatocellular adenoma      HTN (hypertension)      Hyperlipidemia      Infection due to 2019 novel coronavirus      Neck pain      Pelvic floor tension       Past Surgical History:   Procedure Laterality Date     CIRCUMCISION       GUM SURGERY       VASECTOMY        Allergies   Allergen Reactions     Amoxicillin Hives     amoxicillin - [facility] Allergy Remediation     Seasonal Allergies Cough, Difficulty breathing and Headache      Social History     Tobacco Use     Smoking status: Never     Passive exposure: Never     Smokeless tobacco: Never   Substance Use Topics     Alcohol use: Not Currently     Comment: 1 drink month      Wt Readings from Last 1 Encounters:   25 78.5 kg (173 lb 1 oz)        Anesthesia Evaluation   Pt has had prior anesthetic. Type: General and MAC.    History of anesthetic complications  - PONV.      ROS/MED HX  ENT/Pulmonary:     (+)     SHADY risk factors,  hypertension,                              (-) tobacco use   Neurologic:  - neg neurologic ROS  (-) no seizures, no CVA and no TIA   Cardiovascular:     (+) Dyslipidemia hypertension- -   -  - -                                 Previous cardiac testing   Echo: Date: Results:    Stress Test:  Date: Results:    ECG Reviewed:  Date:  Results:  SR  Cath:  Date: Results:      METS/Exercise Tolerance: >4 METS    Hematologic:  - neg hematologic  ROS     Musculoskeletal: Comment: Neck pain, right shoulder/rotator cuff pain      GI/Hepatic: Comment: Hepatic adenoma     (+)             liver disease,    (-) GERD   Renal/Genitourinary: Comment: Pelvic floor tightness      Endo:  - neg endo ROS     Psychiatric/Substance Use:     (+) psychiatric history anxiety      "  Infectious Disease: Comment: Recent COVID-19 infection 3/9/25 - neg infectious disease ROS     Malignancy:  - neg malignancy ROS     Other:  - neg other ROS        Physical Exam    Airway         TM distance: > 3 FB   Neck ROM: full   Mouth opening: > 3 cm    Respiratory Devices and Support         Dental       (+) Completely normal teeth      Cardiovascular   cardiovascular exam normal          Pulmonary   pulmonary exam normal            OUTSIDE LABS:  CBC:   Lab Results   Component Value Date    WBC 5.9 03/28/2025    WBC 5.0 01/24/2025    HGB 15.3 03/28/2025    HGB 15.0 01/24/2025    HCT 45.8 03/28/2025    HCT 44.5 01/24/2025     03/28/2025     01/24/2025     BMP:   Lab Results   Component Value Date     03/28/2025     01/24/2025    POTASSIUM 4.0 03/28/2025    POTASSIUM 4.2 01/24/2025    CHLORIDE 103 03/28/2025    CHLORIDE 104 01/24/2025    CO2 29 03/28/2025    CO2 26 01/24/2025    BUN 18.5 03/28/2025    BUN 17.2 01/24/2025    CR 0.88 03/28/2025    CR 0.86 01/24/2025    GLC 95 04/09/2025    GLC 77 03/28/2025     COAGS:   Lab Results   Component Value Date    INR 1.02 03/28/2025     POC: No results found for: \"BGM\", \"HCG\", \"HCGS\"  HEPATIC:   Lab Results   Component Value Date    ALBUMIN 4.6 03/28/2025    PROTTOTAL 7.2 03/28/2025    ALT 37 03/28/2025    AST 28 03/28/2025    ALKPHOS 66 03/28/2025    BILITOTAL 0.4 03/28/2025     OTHER:   Lab Results   Component Value Date    KIRSTEN 9.6 03/28/2025       Anesthesia Plan    ASA Status:  2       Anesthesia Type: General.     - Airway: ETT   Induction: Intravenous, Propofol.   Maintenance: Balanced.   Techniques and Equipment:     - Airway: Video-Laryngoscope     - Lines/Monitors: 2nd IV, Arterial Line     Consents    Anesthesia Plan(s) and associated risks, benefits, and realistic alternatives discussed. Questions answered and patient/representative(s) expressed understanding.     - Discussed: Risks, Benefits and Alternatives for the PROCEDURE " were discussed     - Discussed with:  Patient      - Extended Intubation/Ventilatory Support Discussed: Yes.      - Patient is DNR/DNI Status: No     Use of blood products discussed: Yes.     - Discussed with: Patient.     - Consented: consented to blood products     Postoperative Care    Pain management: IV analgesics, Oral pain medications, Multi-modal analgesia.   PONV prophylaxis: Ondansetron (or other 5HT-3), Dexamethasone or Solumedrol, Background Propofol Infusion     Comments:             Sindy Razo MD    Clinically Significant Risk Factors Present on Admission

## 2025-04-09 NOTE — PROVIDER NOTIFICATION
Provider notification;    Dr. Liang from the pain team notified at [address] [employer].    Reason for notification: patient has a right sided paravertebral block and is experiencing some numbness in his R thumb and R pointer finger.     Plan: Provider aware

## 2025-04-09 NOTE — OP NOTE
Patient: Ivan Goyal  MRN: [id number]  Date of Surgery: 4/9/2025     Pre-Op Diagnosis: Hepatic Adenoma     Post-Op Diagnosis: Hepatic Adenoma     Title of Operation:   1. Laparotomy  2. Intraoperative Liver Ultrasound  3. Partial Central Hepatectomy (Segment 4b/5) w/ En Bloc Cholecystectomy     Anesthesia Type: General Endotracheal  Attending Physician: Brian Lazo MD  Assistants: Sasha Latham MD     Indications: 34 M w/ biopsy proven hepatic adenoma in the central liver in segment 4b/5 and displacing the gallbladder.  This has been fairly stable over time (~6 cm).  We discussed the elevated risk of cancer development within adenomas in men.  Given this and his young age, the patient was recommended to undergo surgical resection for potential definitive management.  He presented for surgery today.  Please see my prior note for further details.     Findings/Description of Procedure: After appropriate informed consent was obtained, the patient was brought to the operating room where a timeout was performed to identify the correct patient, procedure, and site.  Antibiotics were administered for perioperative prophylaxis.  SCDs were placed for DVT prophylaxis.  The patient was also given chemical DVT prophylaxis in the pre-operative holding area.  He was then induced under general endotracheal anesthesia.  An OG tube and Sebastian catheter were placed.  The patient was placed in supine position with arms to the sides.  The patient was prepped and draped in a sterile fashion.  We entered the abdomen via a right subcostal incision and a small midline extension toward the xyphoid.  The abdominal cavity was without adhesions.  Due to the location of the incision, we were not able to visualize the entire peritoneal cavity.  The falciform was divided over the liver with Harmonic and used as a retraction handle.  The Avina retractor was placed.  Right upper quadrant exploration revealed a normal appearing  liver and gallbladder.  There was a visible tumor in the expected location of the peripheral central liver displacing the gallbladder inferiorly and anterior.  There was no free fluid that we could see.  Visible hollow viscus organs appeared normal.  The maximilian hepatis was accessible and covered with soft fat.  An umbilical tape was placed around the portal triad via the foramen of Kemmerer in the event a [place] maneuver was needed.  Ultrasound of the liver showed relatively normal hepatic vascular anatomy.  In peripheral segment 4b/5, there was a homogeneous, hyperechoic tumor that had a lobulated appearance with well defined borders.  This correlated with the known and biopsy proven adenoma.  The gallbladder overlying this tumor was not invaded and appeared normal, only being attached by thin peritoneal attachments.  The tumor did not invade deep into the liver parenchyma and it was well away from the hilum and the main divisions of the maximilian (i.e. right anterior portal vein/artery, right posterior portal vein/artery, and left portal vein/artery).  There were some large hepatic vein branches going to the tumor that would need to be sacrificed, but there were several good sized right and middle hepatic vein branches that would be spared and drain the remaining liver.  Similarly, there were some small portal pedicles going directly to the tumor, but there were portal pedicle branches that would be spared and supply the remaining liver.  No liver mobilization was required for this operation.  We started by dissecting out the cystic duct and artery.  This could be seen through the thin peritoneum/fat surrounding these structures and could be seen directly entering the base of the gallbladder.  The cystic duct and artery were fairly long.  The cystic duct and artery were successfully dissected out and seen as the only two structures entering the base of the gallbladder.  We could see the liver behind for the critical  view of safety.  We stayed high on the cystic duct.  Both the cystic duct and artery were tied, double clipped, and divided.  This allowed us to raise the gallbladder off the cystic plate so that the only remaining attachments of the gallbladder were directly to the tumor.  The gallbladder was used as a handle for the rest of the case.  Immediately adjacent to the cystic artery, there were two other small vessels traveling directly to the base of the tumor.  These were clipped and divided.  As stated, the tumor was well away from the maximilian and we could see the common bile duct and right hepatic artery in the maximilian well away from the tumor.  We marked the edges of the tumor with ultrasound then planned a transection line 1 cm away from the tumor so as to provide a negative margin.  The proposed transection line was curve shaped into the central liver.  We divided the first 1 cm of liver parenchyma with Harmonic scalpel.  The remainder (which was the majority) of the parenchyma was divided with CUSA Clarity (ultrasonic aspirator) while using AquaMantys for smaller vessel hemostasis, and a 35 mm vascular stapler to divide larger hepatic venous branches and some smaller portal pedicles going directly to the tumor/specimen.  This was done without the use of a [place] maneuver and resulted in ~400 ml blood loss.  The specimen was removed from the body and hemostasis was quite good.  Two venous bleeders from the staying liver edge required ligation with 4-0 Prolene, which resulted in excellent hemostasis.  Snow hemostatic agent and gentle pressure with lap pads were applied to the staying cut liver surface.  The specimen was sent to pathology as partial central hepatectomy (segment 4b/5) with en bloc cholecystectomy.  It was sent for gross and permanent section.  Gross examination revealed a grossly negative parenchymal margin on the tumor with the closest distance being 6 mm.  The resection was complete.  We returned our  attention to hemostasis.  The lap pads and Snow were removed.  Hemostasis was already excellent.  AquaMantys cautery was delivered over the entire staying cut surface but was avoided near the Prolene sutures.  Even with Valsalva, there was excellent hemostasis and no evidence of bile leakage onto a clean lap pad.  The right upper quadrant was irrigated.  Hemoblast was placed over the staying cut liver surface for ~5 minutes.  The excess was carefully washed away with irrigation.  Again, hemostasis was excellent.  The staying cut liver surface was then coated with fibrin glue.  Examination of the liver revealed normal color with no portions that appeared to be devascularized.  There was dopplerable arterial flow all along the resection margin and in all remaining segments.  Ultrasound with color doppler revealed intact portal venous and hepatic venous flow to all remaining liver segments.  No drain was placed.  Final examination of the abdominal cavity revealed excellent hemostasis, proper bowel orientation in the right upper quadrant, and no evidence of inadvertent injury.  The right subcostal incision was closed in two layers at the fascial level with running 0 looped PDS.  The incision was irrigated above the fascia.  The incision was then closed with 3-0 Vicryl deep dermal sutures and 4-0 Monacryl subcuticular sutures.  Skin glue was used as the bandage.  All instrument, sponge, and needle counts were correct x 2.  The patient was then taken to the PACU in stable condition.  He tolerated the procedure well and I discussed the case with the mom and dad in the waiting room.      Estimated Blood Loss: ~400 ml  Allie Time: None  Urine Output: See anesthesia record  Fluids: See anesthesia record  Drains: None  Specimens: Partial Central Hepatectomy (Segment 4b/5) w/ En Bloc Cholecystectomy (Gross and Permanent)     Disposition: PACU --> Surgical Oncology Floor

## 2025-04-09 NOTE — ANESTHESIA PROCEDURE NOTES
Arterial Line Procedure Note    Pre-Procedure   Staff -        Other Anesthesia Staff: Phil Ramey RN       Performed By: NED       Location: OR       Pre-Anesthestic Checklist: patient identified, IV checked, risks and benefits discussed, informed consent, monitors and equipment checked, pre-op evaluation and at physician/surgeon's request  Timeout:       Correct Patient: Yes        Correct Procedure: Yes        Correct Site: Yes        Correct Position: Yes   Line Placement:   This line was placed Post Induction  Procedure   Procedure: arterial line       Diagnosis: Hepatic Adenocarcinoma       Laterality: left       Insertion Site: radial.  Sterile Prep        Standard elements of sterile barrier followed       Skin prep: Chloraprep  Insertion/Injection        Technique: ultrasound guided, Seldinger Technique and Tyler's test completed        1. Ultrasound was used to evaluate the access site.       2. Artery evaluated via ultrasound for patency/adequacy.       3. Using real-time ultrasound the needle/catheter was observed entering the artery/vein.       5. The visualized structures were anatomically normal.       6. There were no apparent abnormal pathologic findings.       Catheter Type/Size: 20 G, 12 cm  Narrative        Tegaderm dressing used.       Complications: None apparent,        Arterial waveform: Yes        IBP within 10% of NIBP: Yes

## 2025-04-09 NOTE — PHARMACY-ADMISSION MEDICATION HISTORY
Pharmacist Admission Medication History    Admission medication history is complete. The information provided in this note is only as accurate as the sources available at the time of the update.    Information Source(s):  Pre-op RN assessment, Dispense history (Surescripts)      Medication History Completed By: Eleanor Hull RPH 4/9/2025 5:28 PM    PTA Med List   Medication Sig Last Dose/Taking    amLODIPine (NORVASC) 5 MG tablet Take 1 tablet (5 mg) by mouth daily. (Patient taking differently: Take 5 mg by mouth every morning.) 4/9/2025 at  3:30 AM    cetirizine (ZYRTEC) 10 MG tablet Take 10 mg by mouth daily as needed for allergies. More than a month    cholecalciferol (VITAMIN D3) 25 mcg (1000 units) capsule Take 1 capsule by mouth every morning. More than a month    escitalopram (LEXAPRO) 5 MG tablet Take 1 tablet (5mg) once daily. After 2 weeks, increase to 2 tablets (10mg) daily. (Patient taking differently: Take 10 mg by mouth every morning. Take 1 tablet (5mg) once daily. After 2 weeks, increase to 2 tablets (10mg) daily.) 4/9/2025 Morning

## 2025-04-09 NOTE — ANESTHESIA PROCEDURE NOTES
Airway         Procedure Start/Stop Times: 4/9/2025 7:48 AM  Staff -        Anesthesiologist:  Sindy Razo MD       CRNA: Gautam Polk APRN CRNA       Other Anesthesia Staff: Phil Ramey RN       Performed By: anesthesiologist, CRNA and SRNA  Consent for Airway        Urgency: elective  Indications and Patient Condition       Indications for airway management: jason-procedural       Induction type:intravenous       Mask difficulty assessment: 1 - vent by mask    Final Airway Details       Final airway type: endotracheal airway       Successful airway: ETT - single  Endotracheal Airway Details        Successful intubation technique: video laryngoscopy       VL Blade Size: Glidescope 4       Grade View of Cords: 1       Adjucts: stylet       Position: Right       Measured from: gums/teeth       Secured at (cm): 24       Bite block used: None    Post intubation assessment        Placement verified by: capnometry, equal breath sounds and chest rise        Number of attempts at approach: 1       Number of other approaches attempted: 0       Secured with: tape       Ease of procedure: easy       Dentition: Intact and Unchanged    Medication(s) Administered   Medication Administration Time: 4/9/2025 7:48 AM

## 2025-04-09 NOTE — ANESTHESIA POSTPROCEDURE EVALUATION
Patient: Ivan Goyal    Procedure: Procedure(s):  OPEN PARTIAL CENTRAL HEPATECTOMY  CHOLECYSTECTOMY, INTRAOPERATIVE ULTRASOUND       Anesthesia Type:  No value filed.    Note:  Disposition: Admission   Postop Pain Control: Uneventful            Sign Out: Well controlled pain   PONV: No   Neuro/Psych: Uneventful            Sign Out: Acceptable/Baseline neuro status   Airway/Respiratory: Uneventful            Sign Out: Acceptable/Baseline resp. status   CV/Hemodynamics: Uneventful            Sign Out: Acceptable CV status; No obvious hypovolemia; No obvious fluid overload   Other NRE: NONE   DID A NON-ROUTINE EVENT OCCUR? No           Last vitals:  Vitals Value Taken Time   /57 04/09/25 1400   Temp 37.1  C (98.7  F) 04/09/25 1400   Pulse 75 04/09/25 1409   Resp 11 04/09/25 1409   SpO2 100 % 04/09/25 1409   Vitals shown include unfiled device data.    Electronically Signed By: NELI KINGSTON MD  April 9, 2025  2:09 PM

## 2025-04-09 NOTE — ANESTHESIA PROCEDURE NOTES
Paravertebral Procedure Note    Pre-Procedure   Staff -        Anesthesiologist:  Gomez Martinez MD       Resident/Fellow: Jet Mendoza MD       Performed By: resident, anesthesiologist and with residents       Procedure performed by resident/fellow/CRNA in presence of a teaching physician.         Location: pre-op       Procedure Start/Stop Times: 4/9/2025 6:52 AM and 4/9/2025 7:03 AM       Pre-Anesthestic Checklist: patient identified, IV checked, site marked, risks and benefits discussed, informed consent, monitors and equipment checked, pre-op evaluation, at physician/surgeon's request and post-op pain management  Timeout:       Correct Patient: Yes        Correct Procedure: Yes        Correct Site: Yes        Correct Position: Yes        Correct Laterality: Yes        Site Marked: Yes  Procedure Documentation  Procedure: Paravertebral  Thoracic plane block         Diagnosis: PERIOPERATIVE AND POSTOPERATIVE ANALGESIA       Laterality: right       Patient Prep/Sterile Barriers: sterile gloves, mask       Skin prep: Chloraprep       Local skin infiltrated with 5 mL of 1% lidocaine.        Insertion Site: T7-8.       Needle Type: Touhy needle       Needle Gauge: 17.        Needle Length (millimeters): 90        Catheter: 17 G.          Catheter threaded easily.             Ultrasound guided       1. Ultrasound was used to identify targeted nerve, plexus, vascular marker, or fascial plane and place a needle adjacent to it in real-time.       2. Ultrasound was used to visualize the spread of anesthetic in close proximity to the above referenced structure.       3. A permanent image is entered into the patient's record.    Assessment/Narrative         The placement was negative for: blood aspirated, painful injection and site bleeding       Paresthesias: No.       Bolus given via catheter. no blood aspirated via catheter.        Secured via Tegaderm and Dermabond.        Insertion/Infusion Method: Continuous  "Infusion and Single Shot       Complications: none    Medication(s) Administered   Bupivacaine 0.25% PF (Infiltration) - Infiltration   10 mL - 4/9/2025 7:03:00 AM  Medication Administration Time: 4/9/2025 6:52 AM      FOR [facility] ONLY:   Pain Team Contact information: please page the Pain Team Via [employer]. Search \"Pain\". During daytime hours, please page the attending first. At night please page the resident first.      "

## 2025-04-09 NOTE — PLAN OF CARE
Goal Outcome Evaluation:      Plan of Care Reviewed With: patient, parent    Overall Patient Progress: improvingOverall Patient Progress: improving    Outcome Evaluation: POD#0    Nursing Focus: Admission    D: Patient admitted/transferred from PACU via transport for recovery from surgery.      I: Upon arrival to the unit patient was oriented to room, unit, and call light. Patient s height, weight, and vital signs were obtained. Allergies reviewed and allergy band applied. MD notified of patient s arrival on the unit. Adult AVS completed by bedside RN. Head to toe assessment completed. Education assessment completed. Care plan initiated.     A: Vital signs stable upon admission. Patient rates pain at 5/10. Two RN skin assessment completed NO. 2 RN skin check still needs to be completed. Significant Skin Findings include midline incision.     P: Continue to monitor patient s pain and nausea and intervene as needed. Continue with plan of care. Notify MD with any concerns or changes in patient status.      Patient admitted to  around 1630, VSS on RA. Pain being managed with Dilaudid PCA and ice packs. Intermittent nausea managed with PRN zofran x1. Paravertebral block in place, patient does have numbness in R thumb/pointer finger - pain team aware. No other LAST symptoms. Sebastian in place with adequate output. Patient using incentive spirometer, good OOB and walked in the quiroz - felt a little dizzy so took a break and was able to walk back to room. Tolerating CLD, trying an ensure clear this suellen.

## 2025-04-09 NOTE — ANESTHESIA CARE TRANSFER NOTE
Patient: Ivan Goyal    Procedure: Procedure(s):  OPEN PARTIAL CENTRAL HEPATECTOMY  CHOLECYSTECTOMY, INTRAOPERATIVE ULTRASOUND       Diagnosis: Hepatocellular adenoma [D13.4]  Diagnosis Additional Information: No value filed.    Anesthesia Type:   No value filed.     Note:    Oropharynx: oropharynx clear of all foreign objects  Level of Consciousness: awake  Oxygen Supplementation: nasal cannula  Level of Supplemental Oxygen (L/min / FiO2): 2  Independent Airway: airway patency satisfactory and stable    Vital Signs Stable: post-procedure vital signs reviewed and stable  Report to RN Given: handoff report given  Patient transferred to: PACU    Handoff Report: Identifed the Patient, Identified the Reponsible Provider, Reviewed the pertinent medical history, Discussed the surgical course, Reviewed Intra-OP anesthesia mangement and issues during anesthesia, Set expectations for post-procedure period and Allowed opportunity for questions and acknowledgement of understanding      Vitals:  Vitals Value Taken Time   /59 04/09/25 1245   Temp 37  C (98.6  F) 04/09/25 1245   Pulse 81 04/09/25 1254   Resp 10 04/09/25 1254   SpO2 98 % 04/09/25 1254   Vitals shown include unfiled device data.    Electronically Signed By: SEDA Lama CRNA  April 9, 2025  12:55 PM

## 2025-04-09 NOTE — BRIEF OP NOTE
[facility]    Brief Operative Note    Pre-operative diagnosis: Hepatocellular adenoma [D13.4]  Post-operative diagnosis Same as pre-operative diagnosis    Procedure: OPEN PARTIAL CENTRAL HEPATECTOMY, N/A - Abdomen  CHOLECYSTECTOMY, INTRAOPERATIVE ULTRASOUND, N/A - Abdomen    Surgeon: Surgeons and Role:     * Brian Lazo MD - Primary     * Sasha Latham MD - Resident - Assisting  Anesthesia: General with Block   Estimated Blood Loss: 400mL    Drains: None  Specimens:   ID Type Source Tests Collected by Time Destination   1 : Partial central hepatectomy segment 4B-5 with en bloc cholecystectomy Tissue Liver SURGICAL PATHOLOGY EXAM Brian Lazo MD 4/9/2025 10:47 AM      Findings:   Hepatic adenoma at segment 4b/5 . Partial central hepatectomy of segment 4b/5  Complications: None.  Implants: * No implants in log *

## 2025-04-10 ENCOUNTER — APPOINTMENT (OUTPATIENT)
Dept: OCCUPATIONAL THERAPY | Facility: CLINIC | Age: 35
End: 2025-04-10
Attending: STUDENT IN AN ORGANIZED HEALTH CARE EDUCATION/TRAINING PROGRAM
Payer: COMMERCIAL

## 2025-04-10 VITALS
SYSTOLIC BLOOD PRESSURE: 118 MMHG | RESPIRATION RATE: 18 BRPM | HEART RATE: 76 BPM | WEIGHT: 173.7 LBS | DIASTOLIC BLOOD PRESSURE: 75 MMHG | HEIGHT: 75 IN | OXYGEN SATURATION: 98 % | TEMPERATURE: 97.6 F | BODY MASS INDEX: 21.6 KG/M2

## 2025-04-10 LAB
ALBUMIN SERPL BCG-MCNC: 3.9 G/DL (ref 3.5–5.2)
ALP SERPL-CCNC: 41 U/L (ref 40–150)
ALT SERPL W P-5'-P-CCNC: 228 U/L (ref 0–70)
ANION GAP SERPL CALCULATED.3IONS-SCNC: 10 MMOL/L (ref 7–15)
AST SERPL W P-5'-P-CCNC: 169 U/L (ref 0–45)
BILIRUB SERPL-MCNC: 0.7 MG/DL
BUN SERPL-MCNC: 9.2 MG/DL (ref 6–20)
CALCIUM SERPL-MCNC: 8.6 MG/DL (ref 8.8–10.4)
CHLORIDE SERPL-SCNC: 105 MMOL/L (ref 98–107)
CREAT SERPL-MCNC: 0.8 MG/DL (ref 0.67–1.17)
EGFRCR SERPLBLD CKD-EPI 2021: >90 ML/MIN/1.73M2
ERYTHROCYTE [DISTWIDTH] IN BLOOD BY AUTOMATED COUNT: 13.3 % (ref 10–15)
GLUCOSE SERPL-MCNC: 104 MG/DL (ref 70–99)
HCO3 SERPL-SCNC: 25 MMOL/L (ref 22–29)
HCT VFR BLD AUTO: 37.4 % (ref 40–53)
HGB BLD-MCNC: 12.6 G/DL (ref 13.3–17.7)
INR PPP: 1.2 (ref 0.85–1.15)
MCH RBC QN AUTO: 28.4 PG (ref 26.5–33)
MCHC RBC AUTO-ENTMCNC: 33.7 G/DL (ref 31.5–36.5)
MCV RBC AUTO: 84 FL (ref 78–100)
PHOSPHATE SERPL-MCNC: 3.5 MG/DL (ref 2.5–4.5)
PLATELET # BLD AUTO: 169 10E3/UL (ref 150–450)
POTASSIUM SERPL-SCNC: 3.9 MMOL/L (ref 3.4–5.3)
PROT SERPL-MCNC: 5.7 G/DL (ref 6.4–8.3)
RBC # BLD AUTO: 4.44 10E6/UL (ref 4.4–5.9)
SODIUM SERPL-SCNC: 140 MMOL/L (ref 135–145)
WBC # BLD AUTO: 10.7 10E3/UL (ref 4–11)

## 2025-04-10 PROCEDURE — 97530 THERAPEUTIC ACTIVITIES: CPT | Mod: GO | Performed by: OCCUPATIONAL THERAPIST

## 2025-04-10 PROCEDURE — 85014 HEMATOCRIT: CPT | Performed by: STUDENT IN AN ORGANIZED HEALTH CARE EDUCATION/TRAINING PROGRAM

## 2025-04-10 PROCEDURE — 120N000002 HC R&B MED SURG/OB UMMC

## 2025-04-10 PROCEDURE — 250N000011 HC RX IP 250 OP 636: Performed by: STUDENT IN AN ORGANIZED HEALTH CARE EDUCATION/TRAINING PROGRAM

## 2025-04-10 PROCEDURE — 99233 SBSQ HOSP IP/OBS HIGH 50: CPT | Mod: GC | Performed by: ANESTHESIOLOGY

## 2025-04-10 PROCEDURE — 999N000147 HC STATISTIC PT IP EVAL DEFER

## 2025-04-10 PROCEDURE — 85610 PROTHROMBIN TIME: CPT | Performed by: STUDENT IN AN ORGANIZED HEALTH CARE EDUCATION/TRAINING PROGRAM

## 2025-04-10 PROCEDURE — 36415 COLL VENOUS BLD VENIPUNCTURE: CPT | Performed by: STUDENT IN AN ORGANIZED HEALTH CARE EDUCATION/TRAINING PROGRAM

## 2025-04-10 PROCEDURE — 97165 OT EVAL LOW COMPLEX 30 MIN: CPT | Mod: GO | Performed by: OCCUPATIONAL THERAPIST

## 2025-04-10 PROCEDURE — 250N000009 HC RX 250: Performed by: STUDENT IN AN ORGANIZED HEALTH CARE EDUCATION/TRAINING PROGRAM

## 2025-04-10 PROCEDURE — 97535 SELF CARE MNGMENT TRAINING: CPT | Mod: GO | Performed by: OCCUPATIONAL THERAPIST

## 2025-04-10 PROCEDURE — 82947 ASSAY GLUCOSE BLOOD QUANT: CPT | Performed by: STUDENT IN AN ORGANIZED HEALTH CARE EDUCATION/TRAINING PROGRAM

## 2025-04-10 PROCEDURE — 258N000003 HC RX IP 258 OP 636: Performed by: STUDENT IN AN ORGANIZED HEALTH CARE EDUCATION/TRAINING PROGRAM

## 2025-04-10 PROCEDURE — 250N000013 HC RX MED GY IP 250 OP 250 PS 637: Performed by: STUDENT IN AN ORGANIZED HEALTH CARE EDUCATION/TRAINING PROGRAM

## 2025-04-10 PROCEDURE — 84100 ASSAY OF PHOSPHORUS: CPT | Performed by: STUDENT IN AN ORGANIZED HEALTH CARE EDUCATION/TRAINING PROGRAM

## 2025-04-10 PROCEDURE — 250N000013 HC RX MED GY IP 250 OP 250 PS 637: Performed by: PHYSICIAN ASSISTANT

## 2025-04-10 PROCEDURE — 250N000011 HC RX IP 250 OP 636: Performed by: PHYSICIAN ASSISTANT

## 2025-04-10 PROCEDURE — 84155 ASSAY OF PROTEIN SERUM: CPT | Performed by: STUDENT IN AN ORGANIZED HEALTH CARE EDUCATION/TRAINING PROGRAM

## 2025-04-10 RX ORDER — POLYETHYLENE GLYCOL 3350 17 G/17G
17 POWDER, FOR SOLUTION ORAL DAILY
Status: DISCONTINUED | OUTPATIENT
Start: 2025-04-10 | End: 2025-04-14 | Stop reason: HOSPADM

## 2025-04-10 RX ORDER — METHOCARBAMOL 500 MG/1
500 TABLET, FILM COATED ORAL 4 TIMES DAILY
Status: DISCONTINUED | OUTPATIENT
Start: 2025-04-10 | End: 2025-04-13

## 2025-04-10 RX ORDER — ACETAMINOPHEN 325 MG/1
650 TABLET ORAL EVERY 8 HOURS
Status: DISCONTINUED | OUTPATIENT
Start: 2025-04-10 | End: 2025-04-11

## 2025-04-10 RX ORDER — ENOXAPARIN SODIUM 100 MG/ML
40 INJECTION SUBCUTANEOUS EVERY 24 HOURS
Status: DISCONTINUED | OUTPATIENT
Start: 2025-04-10 | End: 2025-04-14

## 2025-04-10 RX ADMIN — ENOXAPARIN SODIUM 40 MG: 40 INJECTION SUBCUTANEOUS at 11:10

## 2025-04-10 RX ADMIN — POLYETHYLENE GLYCOL 3350 17 G: 17 POWDER, FOR SOLUTION ORAL at 08:26

## 2025-04-10 RX ADMIN — METHOCARBAMOL 500 MG: 500 TABLET ORAL at 16:06

## 2025-04-10 RX ADMIN — ACETAMINOPHEN 650 MG: 325 TABLET, FILM COATED ORAL at 23:47

## 2025-04-10 RX ADMIN — METHOCARBAMOL 500 MG: 500 TABLET ORAL at 20:07

## 2025-04-10 RX ADMIN — ONDANSETRON 4 MG: 2 INJECTION INTRAMUSCULAR; INTRAVENOUS at 10:45

## 2025-04-10 RX ADMIN — ACETAMINOPHEN 650 MG: 325 TABLET, FILM COATED ORAL at 08:26

## 2025-04-10 RX ADMIN — SODIUM CHLORIDE, SODIUM LACTATE, POTASSIUM CHLORIDE, AND CALCIUM CHLORIDE: .6; .31; .03; .02 INJECTION, SOLUTION INTRAVENOUS at 00:16

## 2025-04-10 RX ADMIN — ACETAMINOPHEN 650 MG: 325 TABLET, FILM COATED ORAL at 16:06

## 2025-04-10 RX ADMIN — METHOCARBAMOL 500 MG: 500 TABLET ORAL at 12:19

## 2025-04-10 RX ADMIN — ESCITALOPRAM OXALATE 10 MG: 10 TABLET ORAL at 08:26

## 2025-04-10 RX ADMIN — Medication: at 11:00

## 2025-04-10 RX ADMIN — METHOCARBAMOL 500 MG: 500 TABLET ORAL at 08:26

## 2025-04-10 ASSESSMENT — ACTIVITIES OF DAILY LIVING (ADL)
ADLS_ACUITY_SCORE: 32
ADLS_ACUITY_SCORE: 33
ADLS_ACUITY_SCORE: 32
ADLS_ACUITY_SCORE: 33
ADLS_ACUITY_SCORE: 30
ADLS_ACUITY_SCORE: 30
ADLS_ACUITY_SCORE: 33
ADLS_ACUITY_SCORE: 32
ADLS_ACUITY_SCORE: 33
ADLS_ACUITY_SCORE: 32
ADLS_ACUITY_SCORE: 33
ADLS_ACUITY_SCORE: 33
ADLS_ACUITY_SCORE: 32

## 2025-04-10 NOTE — PROGRESS NOTES
[facility]  Surgical Oncology Progress Note    Interval History:  POD 1  Feeling well this morning. He had difficulty sleeping overnight due to pain, he was limiting his use of the PCA. Once he started using the PCA early this morning he was more comfortable and able to get some sleep. Still has significant pain with movement. He had nausea and dizziness yesterday with ambulating after surgery but none overnight and this morning. He is burping.     Physical Exam:   Temp:  [97.5  F (36.4  C)-98.7  F (37.1  C)] 98.4  F (36.9  C)  Pulse:  [58-93] 67  Resp:  [10-18] 18  BP: (107-136)/(56-80) 123/61  MAP:  [64 mmHg-97 mmHg] 97 mmHg  Arterial Line BP: ()/(46-77) 137/77  SpO2:  [98 %-100 %] 98 %  General: Alert, oriented, appears comfortable, NAD.  Respiratory: breathing non labored  Abdomen: Abdomen is soft, tender at right lateral incision. There is some mild bruising inferior to the lateral portion of the incision. , non-distended. Incision is clean, dry and intact.     Data:   All laboratory and imaging data in the past 24 hours reviewed  I/O last 3 completed shifts:  In: 1722 [P.O.:70; I.V.:1402]  Out: 1400 [Urine:1000; Blood:400]  Recent Labs   Lab Test 04/09/25  1656 03/28/25  0805 01/24/25  0721   WBC 11.4* 5.9 5.0   HGB 14.3 15.3 15.0    201 198   INR 1.12 1.02 1.08      Recent Labs   Lab Test 04/09/25  1656 04/09/25  0602 03/28/25  0805 01/24/25  0721     --  140 139   POTASSIUM 4.2  --  4.0 4.2   CHLORIDE 102  --  103 104   CO2 22  --  29 26   BUN 13.3  --  18.5 17.2   CR 0.89  --  0.88 0.86   ANIONGAP 14  --  8 9   KIRSTEN 8.8  --  9.6 9.3   * 95 77 87      Recent Labs   Lab Test 04/09/25 1656   PROTTOTAL 6.5   ALBUMIN 4.4   BILITOTAL 0.9   ALKPHOS 50   *   *     Assessment and Plan:     Ivan Goyal is a 34 year old year old male with history of hypertension and hepatocellular adenoma.     POD 1 s/p laparotomy and partial central hepatectomy  (segment 4B/5) with en bloc cholecystectomy with Dr. Lazo 4/9/25.     - PVC per anesthesia. Scheduled acetaminophen 650 mg q8hrs, scheduled robaxin, dilaudid PCA. Abdominal binder prn for comfort.   - Home lexapro resumed.   - Incentive spirometry. Out of bed and ambulation.   - Clear liquid diet. Protein supplement available.   - Miralax.   - Decrease LR to 50.   - Remove main catheter once ambulating.   - Anemia secondary to surgical blood loss and dilution.   - Elevated LFTs expected after partial hepatectomy, will monitor.   - Lovenox for DVT prophylaxis, ok'ed by anesthesia.   - PT/OT    Discussed with Dr. Delgado who will see the patient today.     NBA Gaytan-C   Surgical Oncology   [facility]

## 2025-04-10 NOTE — PLAN OF CARE
Goal Outcome Evaluation:      Plan of Care Reviewed With: patient    Overall Patient Progress: improving    Outcome Evaluation: [id number]    Significant 24 hour events:        Level of Care: Acute    Summary Type: 5A Post Op Report   POD: #1 = 4/10  Complications:   Pain:  Controlled w/ dilaudid PCA  Neuro:WDL  CV:WDL  Resp:WDL  GI:WDL  :.WDL except, voiding ability/characteristics  Skin: .WDL except, integrity  Activity Assistance:    Safety/Falls Risk: Level of Risk: Moderate Risk  Diet: CLD  Consults:   Procedures/Imaging:   Precautions:         Patient educated on dilaudid PCA use, pain better controlled after education.

## 2025-04-10 NOTE — PLAN OF CARE
Physical Therapy: Orders received. Chart reviewed and discussed with care team.? Physical Therapy not indicated due to patient demonstrating functional mobility near baseline with no acute PT needs per discussion with OT. OT following and will address impairments with mobility.? Defer discharge recommendations to OT and medical team.? Will complete orders.  Please re-consult PT if any new needs arise.

## 2025-04-10 NOTE — PLAN OF CARE
Goal Outcome Evaluation:    [id number]    Plan of Care Reviewed With: patient, parent    Overall Patient Progress: improving    Significant 24 hour events:        Level of Care: Acute    Summary Type: 5A Post Op Report   POD: #1 = 4/10  Complications:   Pain:  Controlled w/ dilaudid PCA, scheduled robaxin and tylenol  Neuro:WDL  CV:WDL  Resp:WDL  GI:WDL, not yet passing gas  :.WDL except, voiding ability/characteristics: due to void at 1700 after main removal at 1100  Skin: .WDL except, integrity: abdominal incision, erythema noted. Epidural site WDL  Activity Assistance:  SBA  Safety/Falls Risk: Level of Risk: Moderate Risk  Diet: CLD, tolerating well, zofran given 1x for nausea  Consults: PT/OT, nutrition    Lovenox teaching started, patient receptive. Patient needs practice and follow up education

## 2025-04-10 NOTE — PROGRESS NOTES
"Pain Service Progress Note  [facility]  Date: 04/10/2025       Patient Name: Ivan Goyal  MRN: [id number]  Age: 34 year old  Sex: male      Assessment:  Ivan Goyal is a 34 year old year old male with history of hypertension and hepatocellular adenoma     Procedure: s/p partial central hepatectomy and cholecystectomy w/Dr. Lazo   Date of Surgery: 4/9/25.    Date of Catheter Placement: 4/9/25      Plan/Recommendations:  1. Regional Anesthesia/Analgesia  -Continuous Catheter Type/Site: right paravertebral (PV) T7-8  Infusate: 0.2 % ropivacaine at   Programmed Intermittent Bolus (PIB) at 10 mL Q60 min via each catheter, total infusion rate of 10 mL/hr    Plan to maintain catheter, max of 7 days    2. Anticoagulation  -Please contact Inpatient Pain Service before ordering or making any anticoagulation changes       3. Multimodal Analgesia  - may consider adding gabapentin 100mg TID if requiring further pain coverage     Pain Service will continue to follow.    Discussed with attending anesthesiologist    Kita Xiong DO CA-1 (PGY2)  04/10/2025     Overnight Events: Rating pain 4-7/10 primarily localized to incisional region, received robaxin prn due to pain while attempting to sleep     Tubes/Drains: Yes  PCA dilaudid IV catheter     Subjective:  Able to participate well in PT/OT therapies this morning, episodes of nausea with overexertion. Was initially avoiding PCA boluses, concerned he would receive \"too large of a dose\". Otherwise doing well with no concerns. Pain well controlled, open to using PCA boluses as needed compared to previous day. Denies oral numbness, tinnitus, metallic taste, or numbness/weakness in BUE/BLE.     Nausea: Yes (with exertion during PT)  Vomiting: No  Pruritus: No  Symptoms of LAST: No    Pain Location:  Incision    Pain Intensity:    Pain at Rest: splinting last night, improved today   Pain with Activity: able to tolerate PT/OT " "activities  Comfort Goal: NA   Baseline Pain: NA   Satisfied with your level of pain control: Yes    Diet: Clear Liquid Diet  Snacks/Supplements Adult: Ensure Clear; With Meals    Relevant Labs:  Recent Labs   Lab Test 04/10/25  0631   INR 1.20*      BUN 9.2       Physical Exam:  Vitals: /64 (BP Location: Right arm)   Pulse 75   Temp 98.2  F (36.8  C) (Oral)   Resp 16   Ht 1.905 m (6' 3\")   Wt 78.8 kg (173 lb 11.2 oz)   SpO2 98%   BMI 21.71 kg/m      Physical Exam:   Orientation:  Alert, oriented, and in no acute distress: Yes  Sedation: No     Motor Examination:  5/5 Strength in lower extremities: Yes    Catheter Site:   Catheter entry site is clean/dry/intact: Yes     Tender: No      Relevant Medications:  Current Pain Medications:  Medications related to Pain Management (From now, onward)      Start     Dose/Rate Route Frequency Ordered Stop    04/10/25 0800  polyethylene glycol (MIRALAX) Packet 17 g         17 g Oral DAILY 04/10/25 0647      04/10/25 0800  methocarbamol (ROBAXIN) tablet 500 mg         500 mg Oral 4 TIMES DAILY 04/10/25 0649      04/10/25 0730  acetaminophen (TYLENOL) tablet 650 mg         650 mg Oral EVERY 8 HOURS 04/10/25 0717      04/09/25 1622  lidocaine 1 % 0.1-1 mL         0.1-1 mL Other EVERY 1 HOUR PRN 04/09/25 1622      04/09/25 1622  lidocaine (LMX4) cream          Topical EVERY 1 HOUR PRN 04/09/25 1622      04/09/25 1400  HYDROmorphone (DILAUDID) PCA 0.2 mg/mL OPIOID NAIVE (age less than 65 years)          Intravenous CONTINUOUS 04/09/25 1342      04/09/25 0830  ROPivacaine 0.2% in sodium chloride 0.9% PERINEURAL infusion          Perineural Continuous Nerve Block 04/09/25 0825              Primary Service Contacted with Recommendations? Yes          Acute Inpatient Pain Service [facility]  Hours of pain coverage 24/7   Page via Amcom- Please Page the Pain Team Via Amcom: \"PAIN MANAGEMENT ACUTE INPATIENT/ [facility]\"            "

## 2025-04-10 NOTE — PROGRESS NOTES
Nursing Focus: Admission    D: Patient admitted/transferred from PACU via hospital transport for recovery from surgery.      I: Upon arrival to the unit patient was oriented to room, unit, and call light. Patient s height, weight, and vital signs were obtained. Allergies reviewed and allergy band applied. MD notified of patient s arrival on the unit. Adult AVS completed by bedside RN/virtual RN. Head to toe assessment completed. Education assessment completed. Care plan initiated.     A: Vital signs stable upon admission. Patient rates pain at 5/10. Two RN skin assessment completed Yes. Second RN was Aubree Kang. Significant Skin Findings include midline abdominal incision, scattered rash, PIV, and epidural site on R shoulder. [facility] Nurse Consult Ordered No. Bed Algorithm can be found in PCS flow sheets (Support Surface Algorithm) and on IP [facility] NURSE RESOURCE TAB, was this used during this assessment? Yes.     P: Continue to monitor patient s pain and vital signs and intervene as needed. Continue with plan of care. Notify MD with any concerns or changes in patient status.

## 2025-04-10 NOTE — PLAN OF CARE
Goal Outcome Evaluation:  [id number]  POD: #1 = 4/10   AVSS, alert and oriented x 4, pain is 4/10 at incision site, on PCA and epidural pump and on schedule Tylenol and Robaxin.  Up independent, voided 280 ml at 1745 after main removed, No BM and  not passing gas after procedure. Mid abdomen incision is intact/clean and open to air. Pt ambulate in quiroz way x 1 this evening.  Continue to monitor care.

## 2025-04-11 ENCOUNTER — APPOINTMENT (OUTPATIENT)
Dept: OCCUPATIONAL THERAPY | Facility: CLINIC | Age: 35
End: 2025-04-11
Attending: SURGERY
Payer: COMMERCIAL

## 2025-04-11 LAB
ALBUMIN SERPL BCG-MCNC: 3.8 G/DL (ref 3.5–5.2)
ALP SERPL-CCNC: 40 U/L (ref 40–150)
ALT SERPL W P-5'-P-CCNC: 221 U/L (ref 0–70)
ANION GAP SERPL CALCULATED.3IONS-SCNC: 8 MMOL/L (ref 7–15)
AST SERPL W P-5'-P-CCNC: 138 U/L (ref 0–45)
BILIRUB SERPL-MCNC: 0.5 MG/DL
BUN SERPL-MCNC: 8.4 MG/DL (ref 6–20)
CALCIUM SERPL-MCNC: 8.9 MG/DL (ref 8.8–10.4)
CHLORIDE SERPL-SCNC: 103 MMOL/L (ref 98–107)
CREAT SERPL-MCNC: 0.86 MG/DL (ref 0.67–1.17)
EGFRCR SERPLBLD CKD-EPI 2021: >90 ML/MIN/1.73M2
ERYTHROCYTE [DISTWIDTH] IN BLOOD BY AUTOMATED COUNT: 13.3 % (ref 10–15)
GLUCOSE SERPL-MCNC: 92 MG/DL (ref 70–99)
HCO3 SERPL-SCNC: 27 MMOL/L (ref 22–29)
HCT VFR BLD AUTO: 36.2 % (ref 40–53)
HGB BLD-MCNC: 11.8 G/DL (ref 13.3–17.7)
INR PPP: 1.14 (ref 0.85–1.15)
MCH RBC QN AUTO: 29 PG (ref 26.5–33)
MCHC RBC AUTO-ENTMCNC: 32.6 G/DL (ref 31.5–36.5)
MCV RBC AUTO: 89 FL (ref 78–100)
PHOSPHATE SERPL-MCNC: 2.8 MG/DL (ref 2.5–4.5)
PLATELET # BLD AUTO: 141 10E3/UL (ref 150–450)
POTASSIUM SERPL-SCNC: 4.5 MMOL/L (ref 3.4–5.3)
PROT SERPL-MCNC: 5.7 G/DL (ref 6.4–8.3)
RBC # BLD AUTO: 4.07 10E6/UL (ref 4.4–5.9)
SODIUM SERPL-SCNC: 138 MMOL/L (ref 135–145)
WBC # BLD AUTO: 6 10E3/UL (ref 4–11)

## 2025-04-11 PROCEDURE — 84100 ASSAY OF PHOSPHORUS: CPT | Performed by: STUDENT IN AN ORGANIZED HEALTH CARE EDUCATION/TRAINING PROGRAM

## 2025-04-11 PROCEDURE — 250N000013 HC RX MED GY IP 250 OP 250 PS 637

## 2025-04-11 PROCEDURE — 250N000011 HC RX IP 250 OP 636: Performed by: PHYSICIAN ASSISTANT

## 2025-04-11 PROCEDURE — 250N000013 HC RX MED GY IP 250 OP 250 PS 637: Performed by: STUDENT IN AN ORGANIZED HEALTH CARE EDUCATION/TRAINING PROGRAM

## 2025-04-11 PROCEDURE — 97535 SELF CARE MNGMENT TRAINING: CPT | Mod: GO | Performed by: OCCUPATIONAL THERAPIST

## 2025-04-11 PROCEDURE — 85018 HEMOGLOBIN: CPT | Performed by: STUDENT IN AN ORGANIZED HEALTH CARE EDUCATION/TRAINING PROGRAM

## 2025-04-11 PROCEDURE — 85610 PROTHROMBIN TIME: CPT | Performed by: STUDENT IN AN ORGANIZED HEALTH CARE EDUCATION/TRAINING PROGRAM

## 2025-04-11 PROCEDURE — 82310 ASSAY OF CALCIUM: CPT | Performed by: STUDENT IN AN ORGANIZED HEALTH CARE EDUCATION/TRAINING PROGRAM

## 2025-04-11 PROCEDURE — 271N000002 HC RX 271: Performed by: STUDENT IN AN ORGANIZED HEALTH CARE EDUCATION/TRAINING PROGRAM

## 2025-04-11 PROCEDURE — 99233 SBSQ HOSP IP/OBS HIGH 50: CPT | Mod: GC | Performed by: ANESTHESIOLOGY

## 2025-04-11 PROCEDURE — 250N000013 HC RX MED GY IP 250 OP 250 PS 637: Performed by: PHYSICIAN ASSISTANT

## 2025-04-11 PROCEDURE — 36415 COLL VENOUS BLD VENIPUNCTURE: CPT | Performed by: STUDENT IN AN ORGANIZED HEALTH CARE EDUCATION/TRAINING PROGRAM

## 2025-04-11 PROCEDURE — 250N000011 HC RX IP 250 OP 636: Performed by: STUDENT IN AN ORGANIZED HEALTH CARE EDUCATION/TRAINING PROGRAM

## 2025-04-11 PROCEDURE — 120N000002 HC R&B MED SURG/OB UMMC

## 2025-04-11 PROCEDURE — 97530 THERAPEUTIC ACTIVITIES: CPT | Mod: GO | Performed by: OCCUPATIONAL THERAPIST

## 2025-04-11 PROCEDURE — 250N000011 HC RX IP 250 OP 636: Mod: JZ | Performed by: STUDENT IN AN ORGANIZED HEALTH CARE EDUCATION/TRAINING PROGRAM

## 2025-04-11 PROCEDURE — 258N000003 HC RX IP 258 OP 636: Performed by: PHYSICIAN ASSISTANT

## 2025-04-11 PROCEDURE — 82040 ASSAY OF SERUM ALBUMIN: CPT | Performed by: STUDENT IN AN ORGANIZED HEALTH CARE EDUCATION/TRAINING PROGRAM

## 2025-04-11 RX ORDER — POLYETHYLENE GLYCOL 3350 17 G/17G
17 POWDER, FOR SOLUTION ORAL DAILY PRN
Qty: 510 G | Refills: 0 | Status: SHIPPED | OUTPATIENT
Start: 2025-04-11

## 2025-04-11 RX ORDER — SIMETHICONE 80 MG
80 TABLET,CHEWABLE ORAL 2 TIMES DAILY PRN
Status: DISCONTINUED | OUTPATIENT
Start: 2025-04-11 | End: 2025-04-14 | Stop reason: HOSPADM

## 2025-04-11 RX ORDER — ACETAMINOPHEN 325 MG/1
650-975 TABLET ORAL EVERY 8 HOURS PRN
Qty: 100 TABLET | Refills: 0 | Status: SHIPPED | OUTPATIENT
Start: 2025-04-11

## 2025-04-11 RX ORDER — SODIUM CHLORIDE, SODIUM LACTATE, POTASSIUM CHLORIDE, CALCIUM CHLORIDE 600; 310; 30; 20 MG/100ML; MG/100ML; MG/100ML; MG/100ML
INJECTION, SOLUTION INTRAVENOUS CONTINUOUS
Status: DISCONTINUED | OUTPATIENT
Start: 2025-04-11 | End: 2025-04-12

## 2025-04-11 RX ORDER — ACETAMINOPHEN 325 MG/1
975 TABLET ORAL EVERY 8 HOURS
Status: DISCONTINUED | OUTPATIENT
Start: 2025-04-11 | End: 2025-04-14 | Stop reason: HOSPADM

## 2025-04-11 RX ORDER — METHOCARBAMOL 500 MG/1
500 TABLET, FILM COATED ORAL EVERY 6 HOURS PRN
Qty: 20 TABLET | Refills: 0 | Status: SHIPPED | OUTPATIENT
Start: 2025-04-11 | End: 2025-04-14

## 2025-04-11 RX ORDER — BISACODYL 10 MG
10 SUPPOSITORY, RECTAL RECTAL ONCE
Status: COMPLETED | OUTPATIENT
Start: 2025-04-11 | End: 2025-04-11

## 2025-04-11 RX ADMIN — ACETAMINOPHEN 975 MG: 325 TABLET, FILM COATED ORAL at 16:06

## 2025-04-11 RX ADMIN — ESCITALOPRAM OXALATE 10 MG: 10 TABLET ORAL at 08:20

## 2025-04-11 RX ADMIN — METHOCARBAMOL 500 MG: 500 TABLET ORAL at 16:06

## 2025-04-11 RX ADMIN — POLYETHYLENE GLYCOL 3350 17 G: 17 POWDER, FOR SOLUTION ORAL at 08:19

## 2025-04-11 RX ADMIN — Medication: at 12:33

## 2025-04-11 RX ADMIN — SIMETHICONE 80 MG: 80 TABLET, CHEWABLE ORAL at 22:25

## 2025-04-11 RX ADMIN — SODIUM CHLORIDE, SODIUM LACTATE, POTASSIUM CHLORIDE, AND CALCIUM CHLORIDE: .6; .31; .03; .02 INJECTION, SOLUTION INTRAVENOUS at 12:33

## 2025-04-11 RX ADMIN — SODIUM CHLORIDE, SODIUM LACTATE, POTASSIUM CHLORIDE, AND CALCIUM CHLORIDE: .6; .31; .03; .02 INJECTION, SOLUTION INTRAVENOUS at 07:30

## 2025-04-11 RX ADMIN — BISACODYL 10 MG: 10 SUPPOSITORY RECTAL at 20:13

## 2025-04-11 RX ADMIN — ACETAMINOPHEN 975 MG: 325 TABLET, FILM COATED ORAL at 23:48

## 2025-04-11 RX ADMIN — SIMETHICONE 80 MG: 80 TABLET, CHEWABLE ORAL at 12:45

## 2025-04-11 RX ADMIN — METHOCARBAMOL 500 MG: 500 TABLET ORAL at 12:33

## 2025-04-11 RX ADMIN — METHOCARBAMOL 500 MG: 500 TABLET ORAL at 08:20

## 2025-04-11 RX ADMIN — ENOXAPARIN SODIUM 40 MG: 40 INJECTION SUBCUTANEOUS at 10:48

## 2025-04-11 RX ADMIN — ACETAMINOPHEN 975 MG: 325 TABLET, FILM COATED ORAL at 08:19

## 2025-04-11 RX ADMIN — ONDANSETRON 4 MG: 4 TABLET, ORALLY DISINTEGRATING ORAL at 09:00

## 2025-04-11 RX ADMIN — METHOCARBAMOL 500 MG: 500 TABLET ORAL at 20:14

## 2025-04-11 ASSESSMENT — ACTIVITIES OF DAILY LIVING (ADL)
ADLS_ACUITY_SCORE: 36
ADLS_ACUITY_SCORE: 36
ADLS_ACUITY_SCORE: 38
ADLS_ACUITY_SCORE: 36
ADLS_ACUITY_SCORE: 38
ADLS_ACUITY_SCORE: 36
ADLS_ACUITY_SCORE: 38
ADLS_ACUITY_SCORE: 36
ADLS_ACUITY_SCORE: 38
ADLS_ACUITY_SCORE: 36
ADLS_ACUITY_SCORE: 38
ADLS_ACUITY_SCORE: 36
ADLS_ACUITY_SCORE: 38
ADLS_ACUITY_SCORE: 36
ADLS_ACUITY_SCORE: 38
ADLS_ACUITY_SCORE: 38
ADLS_ACUITY_SCORE: 36
ADLS_ACUITY_SCORE: 38
ADLS_ACUITY_SCORE: 36

## 2025-04-11 NOTE — PROGRESS NOTES
"Pain Service Progress Note  [facility]  Date: 04/11/2025       Patient Name: Ivan Goyal  MRN: [id number]  Age: 34 year old  Sex: male      Assessment:  Ivan Goyal is a 34 year old year old male with history of hypertension and hepatocellular adenoma     Procedure: s/p partial central hepatectomy and cholecystectomy w/Dr. Lazo   Date of Surgery: 4/9/25.    Date of Catheter Placement: 4/9/25      Plan/Recommendations:  1. Regional Anesthesia/Analgesia  -Continuous Catheter Type/Site: right paravertebral (PV) T7-8  Infusate: 0.2 % ropivacaine at   Programmed Intermittent Bolus (PIB) at 10 mL Q60 min via each catheter, total infusion rate of 10 mL/hr    Plan to maintain catheter, max of 7 days    2. Anticoagulation  -currently on lovenox 40mg daily   -Please contact Inpatient Pain Service before ordering or making any anticoagulation changes       3. Multimodal Analgesia  - per primary team     Pain Service will continue to follow.    Discussed with attending anesthesiologist    Kita Xiong DO CA-1 (PGY2)  04/11/2025     Overnight Events: none  Tubes/Drains: Yes  PCA dilaudid IV catheter     Subjective:   Rating pain 3-4/10 primarily localized to incisional region, improved from 7/10 yesterday. Able to walk 3 laps with PT/OT yesterday. Excellent pain coverage with PCA.     Nausea: No   Vomiting: No  Pruritus: No  Symptoms of LAST: No    Pain Location:  Incision    Pain Intensity:    Pain at Rest: 3/4  Pain with Activity: able to tolerate PT/OT activities well  Comfort Goal: NA   Baseline Pain: NA   Satisfied with your level of pain control: Yes    Diet: Clear Liquid Diet  Diet  Snacks/Supplements Adult: Gelatein 20 (sugar-free); With Meals    Relevant Labs:  Recent Labs   Lab Test 04/10/25  0631   INR 1.20*      BUN 9.2       Physical Exam:  Vitals: /68 (BP Location: Left arm)   Pulse 59   Temp 98.2  F (36.8  C) (Oral)   Resp 18   Ht 1.905 m (6' 3\")   Wt " "78.8 kg (173 lb 11.2 oz)   SpO2 100%   BMI 21.71 kg/m      Physical Exam:   Orientation:  Alert, oriented, and in no acute distress: Yes  Sedation: No     Motor Examination:  5/5 Strength in lower extremities: Yes    Catheter Site:   Catheter entry site is clean/dry/intact: Yes     Tender: No      Relevant Medications:  Current Pain Medications:  Medications related to Pain Management (From now, onward)      Start     Dose/Rate Route Frequency Ordered Stop    04/10/25 0800  polyethylene glycol (MIRALAX) Packet 17 g         17 g Oral DAILY 04/10/25 0647      04/10/25 0800  methocarbamol (ROBAXIN) tablet 500 mg         500 mg Oral 4 TIMES DAILY 04/10/25 0649      04/10/25 0730  acetaminophen (TYLENOL) tablet 650 mg         650 mg Oral EVERY 8 HOURS 04/10/25 0717      04/09/25 1622  lidocaine 1 % 0.1-1 mL         0.1-1 mL Other EVERY 1 HOUR PRN 04/09/25 1622      04/09/25 1622  lidocaine (LMX4) cream          Topical EVERY 1 HOUR PRN 04/09/25 1622      04/09/25 1400  HYDROmorphone (DILAUDID) PCA 0.2 mg/mL OPIOID NAIVE (age less than 65 years)          Intravenous CONTINUOUS 04/09/25 1342      04/09/25 0830  ROPivacaine 0.2% in sodium chloride 0.9% PERINEURAL infusion          Perineural Continuous Nerve Block 04/09/25 0825              Primary Service Contacted with Recommendations? Yes          Acute Inpatient Pain Service [facility]  Hours of pain coverage 24/7   Page via Amcom- Please Page the Pain Team Via Amcom: \"PAIN MANAGEMENT ACUTE INPATIENT/ [facility]\"            "

## 2025-04-11 NOTE — PROGRESS NOTES
"CLINICAL NUTRITION SERVICES    Received consult: Provider order - \"Pt request, currently on CLD will likely advance to regular diet prior to discharge \"    Diet: Clear Liquid    Pt out of room or with other cares during several attempts to visit today.  If diet education needed over the weekend, please re-send consult.     RD will follow per protocol     Ashlyn Rollins RD, , LD  Weekday Units covered: 5A (non-Heme Onc pts) and 7B ([id number])  Available by 5A or 7B Clinical Dietitihernan Victor  Weekend Coverage: Weekend Clinical Dietitihernan Victor    Inpatient Clinical Dietitians no longer available via paging   "

## 2025-04-11 NOTE — PROGRESS NOTES
[facility]  Surgical Oncology Progress Note    Interval History:  POD 2  Pain controlled. Ambulated 7 times yesterday. Yesterday he did have nausea and dizziness with ambulation, controlled with zofran. Tolerated clear liquid diet without nausea. He does feel bloated and is burping. Not passing flatus or stool since surgery. He voided last evening x1, reporting dark urine. He did void this morning.     Physical Exam:   Temp:  [97.6  F (36.4  C)-98.3  F (36.8  C)] 98.2  F (36.8  C)  Pulse:  [53-76] 53  Resp:  [16-18] 18  BP: (112-121)/(64-75) 112/71  SpO2:  [98 %-100 %] 98 %  General: Alert, oriented, appears comfortable, NAD.  Respiratory: breathing non labored  Abdomen: Abdomen is soft, distended. Incision is clean, dry and intact.     Data:   All laboratory and imaging data in the past 24 hours reviewed  I/O last 3 completed shifts:  In: 2010 [P.O.:480; I.V.:1530]  Out: 1280 [Urine:1280]  Recent Labs   Lab Test 04/11/25  0543 04/10/25  0631 04/09/25  1656   WBC 6.0 10.7 11.4*   HGB 11.8* 12.6* 14.3   * 169 174   INR 1.14 1.20* 1.12      Recent Labs   Lab Test 04/11/25  0543 04/10/25  0631 04/09/25  1656    140 138   POTASSIUM 4.5 3.9 4.2   CHLORIDE 103 105 102   CO2 27 25 22   BUN 8.4 9.2 13.3   CR 0.86 0.80 0.89   ANIONGAP 8 10 14   KIRSTEN 8.9 8.6* 8.8   GLC 92 104* 156*      Recent Labs   Lab Test 04/09/25  1656   PROTTOTAL 6.5   ALBUMIN 4.4   BILITOTAL 0.9   ALKPHOS 50   *   *     Assessment and Plan:     Ivan Goyal is a 34 year old year old male with history of hypertension and hepatic adenoma.     POD 2 s/p laparotomy and partial central hepatectomy (segment 4B/5) with en bloc cholecystectomy with Dr. Lazo 4/9/25.     Awaiting return of bowel function. Having some nausea and bloating. Will resume some IV fluids.     - PVC per anesthesia. Acetaminophen scheduled, robaxin scheduled, dilaudid PCA. Abdominal binder prn for comfort.   - Home lexapro  resumed.   - Incentive spirometry. Out of bed and ambulation.   - Ileus: clear liquid diet ok for comfort. Protein supplement available.   - Miralax. Suppository.   -  LR 50  - Anemia secondary to surgical blood loss and dilution.   - Elevated LFTs expected after partial hepatectomy, will monitor.   - Lovenox for DVT prophylaxis  - PT/OT    Discussed with Dr. Taylor.    NBA Gaytan-C   Surgical Oncology   [facility]

## 2025-04-11 NOTE — PLAN OF CARE
[id number]    Significant 24 hour events: Simethicone ordered for gas discomfort.     Level of Care: Acute    Summary Type: 5A Post Op Report   POD: #2 = 4/11  Complications:   Pain:  Controlled w/ dilaudid PCA, narayan Tylenol and Robaxin  Neuro: WDL  CV: WDL  Resp: WDL  GI:.WDL except, GI symptoms - gas discomfort. No BM yet, not passing gas.  : WDL  Skin: .WDL except, integrity - abd incision  Activity Assistance: assistance, stand-by  Safety/Falls Risk: Level of Risk: Moderate Risk  Diet: CLD  Consults: n/a  Procedures/Imaging: n/a  Precautions: n/a    Patient recovering well post-op. Pain controlled w/current pain regiment. Patient ambulated in quiroz this shift. Continue with POC.    Goal Outcome Evaluation:    Plan of Care Reviewed With: patient    Overall Patient Progress: improving

## 2025-04-11 NOTE — PLAN OF CARE
Goal Outcome Evaluation:      Plan of Care Reviewed With: patient    Overall Patient Progress: improvingOverall Patient Progress: improving    Outcome Evaluation: POD 2    [id number]. Alert, oriented, up with stand-by assistance and IV pole. Ambulating in unit halls. Vital signs stable. Pain managed with Dilaudid PCA pump, ropivacaine perineural infusion, and scheduled Tylenol and Robaxin. Tolerating clear liquids; denies nausea. Voiding spontaneously with adequate urine output. Denies passing flatus; no BM yet post-surgery. Abdominal incision open to air; abdominal binder ON. Perineural catheter site dressing is clean, dry, and intact. L PIV infusing IV fluids and PCA pump; R PIV saline locked.

## 2025-04-12 LAB
ALBUMIN SERPL BCG-MCNC: 3.6 G/DL (ref 3.5–5.2)
ALP SERPL-CCNC: 41 U/L (ref 40–150)
ALT SERPL W P-5'-P-CCNC: 198 U/L (ref 0–70)
ANION GAP SERPL CALCULATED.3IONS-SCNC: 8 MMOL/L (ref 7–15)
AST SERPL W P-5'-P-CCNC: 102 U/L (ref 0–45)
BILIRUB SERPL-MCNC: 0.4 MG/DL
BUN SERPL-MCNC: 11.7 MG/DL (ref 6–20)
CALCIUM SERPL-MCNC: 9.1 MG/DL (ref 8.8–10.4)
CHLORIDE SERPL-SCNC: 104 MMOL/L (ref 98–107)
CREAT SERPL-MCNC: 0.84 MG/DL (ref 0.67–1.17)
EGFRCR SERPLBLD CKD-EPI 2021: >90 ML/MIN/1.73M2
ERYTHROCYTE [DISTWIDTH] IN BLOOD BY AUTOMATED COUNT: 12.8 % (ref 10–15)
GLUCOSE SERPL-MCNC: 94 MG/DL (ref 70–99)
HCO3 SERPL-SCNC: 28 MMOL/L (ref 22–29)
HCT VFR BLD AUTO: 35.4 % (ref 40–53)
HGB BLD-MCNC: 11.7 G/DL (ref 13.3–17.7)
MCH RBC QN AUTO: 29 PG (ref 26.5–33)
MCHC RBC AUTO-ENTMCNC: 33.1 G/DL (ref 31.5–36.5)
MCV RBC AUTO: 88 FL (ref 78–100)
PHOSPHATE SERPL-MCNC: 3.7 MG/DL (ref 2.5–4.5)
PLATELET # BLD AUTO: 137 10E3/UL (ref 150–450)
POTASSIUM SERPL-SCNC: 4.6 MMOL/L (ref 3.4–5.3)
PROT SERPL-MCNC: 5.7 G/DL (ref 6.4–8.3)
RBC # BLD AUTO: 4.04 10E6/UL (ref 4.4–5.9)
SODIUM SERPL-SCNC: 140 MMOL/L (ref 135–145)
WBC # BLD AUTO: 4.7 10E3/UL (ref 4–11)

## 2025-04-12 PROCEDURE — 120N000002 HC R&B MED SURG/OB UMMC

## 2025-04-12 PROCEDURE — 99233 SBSQ HOSP IP/OBS HIGH 50: CPT | Performed by: ANESTHESIOLOGY

## 2025-04-12 PROCEDURE — 85027 COMPLETE CBC AUTOMATED: CPT | Performed by: STUDENT IN AN ORGANIZED HEALTH CARE EDUCATION/TRAINING PROGRAM

## 2025-04-12 PROCEDURE — 36415 COLL VENOUS BLD VENIPUNCTURE: CPT | Performed by: STUDENT IN AN ORGANIZED HEALTH CARE EDUCATION/TRAINING PROGRAM

## 2025-04-12 PROCEDURE — 271N000002 HC RX 271: Performed by: STUDENT IN AN ORGANIZED HEALTH CARE EDUCATION/TRAINING PROGRAM

## 2025-04-12 PROCEDURE — 250N000013 HC RX MED GY IP 250 OP 250 PS 637: Performed by: PHYSICIAN ASSISTANT

## 2025-04-12 PROCEDURE — 250N000011 HC RX IP 250 OP 636: Mod: JZ | Performed by: STUDENT IN AN ORGANIZED HEALTH CARE EDUCATION/TRAINING PROGRAM

## 2025-04-12 PROCEDURE — 250N000011 HC RX IP 250 OP 636: Performed by: STUDENT IN AN ORGANIZED HEALTH CARE EDUCATION/TRAINING PROGRAM

## 2025-04-12 PROCEDURE — 250N000013 HC RX MED GY IP 250 OP 250 PS 637: Performed by: STUDENT IN AN ORGANIZED HEALTH CARE EDUCATION/TRAINING PROGRAM

## 2025-04-12 PROCEDURE — 80053 COMPREHEN METABOLIC PANEL: CPT | Performed by: STUDENT IN AN ORGANIZED HEALTH CARE EDUCATION/TRAINING PROGRAM

## 2025-04-12 PROCEDURE — 84100 ASSAY OF PHOSPHORUS: CPT | Performed by: STUDENT IN AN ORGANIZED HEALTH CARE EDUCATION/TRAINING PROGRAM

## 2025-04-12 PROCEDURE — 250N000011 HC RX IP 250 OP 636: Performed by: PHYSICIAN ASSISTANT

## 2025-04-12 RX ADMIN — ENOXAPARIN SODIUM 40 MG: 40 INJECTION SUBCUTANEOUS at 10:38

## 2025-04-12 RX ADMIN — ACETAMINOPHEN 975 MG: 325 TABLET, FILM COATED ORAL at 07:53

## 2025-04-12 RX ADMIN — ACETAMINOPHEN 975 MG: 325 TABLET, FILM COATED ORAL at 16:06

## 2025-04-12 RX ADMIN — ONDANSETRON 4 MG: 4 TABLET, ORALLY DISINTEGRATING ORAL at 05:55

## 2025-04-12 RX ADMIN — METHOCARBAMOL 500 MG: 500 TABLET ORAL at 12:20

## 2025-04-12 RX ADMIN — Medication: at 13:11

## 2025-04-12 RX ADMIN — POLYETHYLENE GLYCOL 3350 17 G: 17 POWDER, FOR SOLUTION ORAL at 07:52

## 2025-04-12 RX ADMIN — ACETAMINOPHEN 975 MG: 325 TABLET, FILM COATED ORAL at 23:40

## 2025-04-12 RX ADMIN — METHOCARBAMOL 500 MG: 500 TABLET ORAL at 19:59

## 2025-04-12 RX ADMIN — METHOCARBAMOL 500 MG: 500 TABLET ORAL at 07:53

## 2025-04-12 RX ADMIN — ESCITALOPRAM OXALATE 10 MG: 10 TABLET ORAL at 07:53

## 2025-04-12 RX ADMIN — METHOCARBAMOL 500 MG: 500 TABLET ORAL at 16:06

## 2025-04-12 ASSESSMENT — ACTIVITIES OF DAILY LIVING (ADL)
ADLS_ACUITY_SCORE: 38
DEPENDENT_IADLS:: INDEPENDENT
ADLS_ACUITY_SCORE: 38

## 2025-04-12 NOTE — PLAN OF CARE
Goal Outcome Evaluation:      Plan of Care Reviewed With: patient    Overall Patient Progress: improvingOverall Patient Progress: improving     Pt anticipates discharge home with family once med ready.

## 2025-04-12 NOTE — PLAN OF CARE
Goal Outcome Evaluation:      Plan of Care Reviewed With: patient    Overall Patient Progress: improvingOverall Patient Progress: improving         Nursing note    Pain/Comfort: Patient was having 3/10 - 5/10 abdominal pain, managed with scheduled tylenol 975mg oral, scheduled robaxin 500mg QID, dilaudid PCA, and Ropivocaine epidural infusion    Primary problem: Post op day 3  Assessments/Progress: Patient is A&Ox4, ambulated unit 6 times by bedtime. Patient had some intermittent anxiety overnight, active listening provided and individualized patient care provided. Patient's questions answered regarding plan of care. Bowel elimination promoted overnight. Patient received a rectal suppository which resulted in 1 formed irregular shape BM and 1 loose BM this morning. Patient started passing gas. Patient is having gas discomfort, having some abdominal cramping, treated with simethicone chewable tablet 80mg PRN x1.     Priority nursing care for next shift: Continue POC  Discharge plan/ barriers to discharge: Pending

## 2025-04-12 NOTE — CONSULTS
Care Management Initial Consult    General Information  Assessment completed with: Patient, Patric Paul and Clary  Type of CM/SW Visit: Initial Assessment  Primary Care Provider verified and updated as needed: Yes   Readmission within the last 30 days: no previous admission in last 30 days      Reason for Consult: insurance concerns  Advance Care Planning: Advance Care Planning Reviewed: no concerns identified          Communication Assessment  Patient's communication style: spoken language (English or Bilingual)    Hearing Difficulty or Deaf: no   Wear Glasses or Blind: yes    Cognitive  Cognitive/Neuro/Behavioral: WDL                      Living Environment:   People in home: alone     Current living Arrangements: apartment      Able to return to prior arrangements: no       Family/Social Support:  Care provided by: self  Provides care for: no one  Marital Status: Single  Support system: Parent(s)          Description of Support System: Supportive, Involved    Support Assessment: Adequate family and caregiver support    Current Resources:   Patient receiving home care services: No  Community Resources: None  Equipment currently used at home: none  Supplies currently used at home: None    Employment/Financial:  Employment Status: employed full-time     Financial Concerns: none   Referral to Financial Worker: No       Does the patient's insurance plan have a 3 day qualifying hospital stay waiver?  Yes     Which insurance plan 3 day waiver is available? Alternative insurance waiver    Will the waiver be used for post-acute placement? No    Lifestyle & Psychosocial Needs:  Social Drivers of Health     Food Insecurity: Low Risk  (4/9/2025)    Food Insecurity     Within the past 12 months, did you worry that your food would run out before you got money to buy more?: No     Within the past 12 months, did the food you bought just not last and you didn t have money to get more?: No   Depression: Not at risk (3/28/2025)     PHQ-2     PHQ-2 Score: 0   Housing Stability: Low Risk  (4/9/2025)    Housing Stability     Do you have housing? : Yes     Are you worried about losing your housing?: No   Tobacco Use: Low Risk  (3/28/2025)    Patient History     Smoking Tobacco Use: Never     Smokeless Tobacco Use: Never     Passive Exposure: Never   Financial Resource Strain: Low Risk  (4/9/2025)    Financial Resource Strain     Within the past 12 months, have you or your family members you live with been unable to get utilities (heat, electricity) when it was really needed?: No   Alcohol Use: Not on file   Transportation Needs: Low Risk  (4/9/2025)    Transportation Needs     Within the past 12 months, has lack of transportation kept you from medical appointments, getting your medicines, non-medical meetings or appointments, work, or from getting things that you need?: No   Physical Activity: Not on file   Interpersonal Safety: Low Risk  (4/9/2025)    Interpersonal Safety     Do you feel physically and emotionally safe where you currently live?: Yes     Within the past 12 months, have you been hit, slapped, kicked or otherwise physically hurt by someone?: No     Within the past 12 months, have you been humiliated or emotionally abused in other ways by your partner or ex-partner?: No   Stress: Not on file   Social Connections: Not on file   Health Literacy: Not on file       Functional Status:  Prior to admission patient needed assistance:   Dependent ADLs:: Independent  Dependent IADLs:: Independent  Assesssment of Functional Status: Not at baseline with ADL Functioning    Mental Health Status:  Mental Health Status: No Current Concerns       Chemical Dependency Status:  Chemical Dependency Status: No Current Concerns             Values/Beliefs:  Spiritual, Cultural Beliefs, Adventist Practices, Values that affect care: no               Discussed  Partnership in Safe Discharge Planning  document with patient/family: No    Additional  Information:    Care Management consulted for insurance issues. SW met with patient at bedside to discuss concerns.     Patient states he received a letter from [facility] about the coverage for his hospital stay ending today on 4/12. Patient states this letter is at home so is unclear whether there was any information about appealing this decisions or contact information. RICARDA called patient's insurance and their office is closed over the weekend. RICARDA left a voicemail with weekday SW's phone number - they are open M-F 8a-5p.     Regarding discharge plans, patient says he will stay with his parents in [place]. He is piecing together support from friends once he returns to his apartment 2-3 weeks post discharge, as he will need help with cleaning, laundry, getting items from the floor, etc.     Care Management will continue to follow regarding insurance issues and follow up on Monday.     Next Steps:   - Follow up with patient's insurance on Monday regarding hospital stay coverage    Olga Gonzales, JODY   4/12/2025       Social Work and Care Management Department       SEARCHABLE in [facility] - search SOCIAL WORK       [place] (0800 - 1630) Saturday and Sunday     Units: 4A Vocera, 4C Vocera, & 4E Vocera        Units: 5A [id number] Vocera, 5A 7258-2542 Vocera , BMT SW 1 BMT SW 2, BMT SW 3 & BMT SW 4  5C Off Service 5401 - 5416  5C Off Service [phone]     Units: 6A Vocera & 6B Vocera      Units: 6C Vocera     Units: 7A Vocera & 7B Vocera      Units: 7C Med Surg 7401 thru 7418 and 7C Med Surg 7502 thru 7521      Unit: [place] ED Vocera & [place] Obs Vocera     [facility] ([id number]) Saturday and Sunday      Units: 5 Ortho Vocera, 5 Med Surg Vocera & WB ED Vocera     Units: 6 Med Surg Vocera, 8 Med Surg Vocera, & 10 ICU Vocera      After hours Vocera [facility] and After Hours Vocera [place]     Please NOTE changes to times below:    **Saturday & Sunday (1630 - 2030)    **Mon-Fri (0149-5682)     **FV  Recognized Holidays  ([id number])    Units: ALL   - see above VOCERA links to units

## 2025-04-12 NOTE — PROGRESS NOTES
CLINICAL NUTRITION SERVICES - BRIEF NOTE    Met with pt at bedside per request. Pt asked questions regarding diet progression post op cholecystectomy. Discussed protein recommendations, strategies to promote weight maintenance, introducing new foods as diet progresses, etc. Pt asked questions regarding typical intake of greek yogurt, cottage cheese, fiber, acidic/spicy foods.     Recommendations to provider:  Please place outpatient referral per patient request    Brittany Beck MS, RD, LD, [facility]    6C (beds 2617-3157) + 7C (beds 0592-7870) + ED + Obs  Available in Vocera by name or unit dietitian

## 2025-04-12 NOTE — PROGRESS NOTES
"Pain Service Progress Note  [facility]  Date: 04/12/2025       Patient Name: Ivan Goyal  MRN: [id number]  Age: 34 year old  Sex: male      Assessment:  Ivan Goyal is a 34 year old year old male with history of hypertension and hepatocellular adenoma     Procedure: s/p partial central hepatectomy and cholecystectomy w/Dr. Lazo   Date of Surgery: 4/9/25.    Date of Catheter Placement: 4/9/25      Plan/Recommendations:  1. Regional Anesthesia/Analgesia  -Continuous Catheter Type/Site: right paravertebral (PV) T7-8  Infusate: 0.2 % ropivacaine at   Programmed Intermittent Bolus (PIB) at 10 mL Q60 min via each catheter, total infusion rate of 10 mL/hr    Plan to maintain catheter, max of 7 days    2. Anticoagulation  -currently on lovenox 40mg daily   -Please contact Inpatient Pain Service before ordering or making any anticoagulation changes       3. Multimodal Analgesia  - Consider discontinuing PCA and transitioning to oral analgesics    Pain Service will continue to follow.    Discussed with attending anesthesiologist    Jcarlos Ramirez MD  04/12/2025     Overnight Events: None    Subjective:   \"I have been moving a lot.\"    Nausea: Yes  Vomiting: No  Pruritus: No  Symptoms of LAST: No    Pain Location:  Abdomen    Pain Intensity:    Pain at Rest: 3/10  Pain with Activity: 4/10  Comfort Goal: 4/10  Satisfied with your level of pain control: Yes    Diet: Diet  Snacks/Supplements Adult: Gelatein 20 (sugar-free); With Meals  Full Liquid Diet    Relevant Labs:  Recent Labs   Lab Test 04/10/25  0631   INR 1.20*      BUN 9.2       Physical Exam:  Vitals: /80 (BP Location: Left arm)   Pulse 60   Temp 97.6  F (36.4  C) (Oral)   Resp 16   Ht 1.905 m (6' 3\")   Wt 81.1 kg (178 lb 11.2 oz)   SpO2 99%   BMI 22.34 kg/m      Physical Exam:   Orientation:  Alert, oriented, and in no acute distress: Yes  Sedation: No     Catheter Site:   Catheter entry site is " "clean/dry/intact: Yes     Tender: No      Relevant Medications:  Current Pain Medications:  Medications related to Pain Management (From now, onward)      Start     Dose/Rate Route Frequency Ordered Stop    04/10/25 0800  polyethylene glycol (MIRALAX) Packet 17 g         17 g Oral DAILY 04/10/25 0647      04/10/25 0800  methocarbamol (ROBAXIN) tablet 500 mg         500 mg Oral 4 TIMES DAILY 04/10/25 0649      04/10/25 0730  acetaminophen (TYLENOL) tablet 650 mg         650 mg Oral EVERY 8 HOURS 04/10/25 0717      04/09/25 1622  lidocaine 1 % 0.1-1 mL         0.1-1 mL Other EVERY 1 HOUR PRN 04/09/25 1622      04/09/25 1622  lidocaine (LMX4) cream          Topical EVERY 1 HOUR PRN 04/09/25 1622      04/09/25 1400  HYDROmorphone (DILAUDID) PCA 0.2 mg/mL OPIOID NAIVE (age less than 65 years)          Intravenous CONTINUOUS 04/09/25 1342      04/09/25 0830  ROPivacaine 0.2% in sodium chloride 0.9% PERINEURAL infusion          Perineural Continuous Nerve Block 04/09/25 0825              Primary Service Contacted with Recommendations? Yes    Please see A&P for additional details of medical decision making.  Medical complexity over the past 24 hours:  - Parenteral (IV) CONTROLLED SUBSTANCES ordered  - Intensive monitoring for MEDICATION TOXICITY    Acute Inpatient Pain Service [facility]  Hours of pain coverage 24/7   Page via Amcom- Please Page the Pain Team Via Amcom: \"PAIN MANAGEMENT ACUTE INPATIENT/ [facility]\"            "

## 2025-04-12 NOTE — PROGRESS NOTES
[facility]  Surgical Oncology Progress Note    Interval History:  POD 3  Pain controlled. Ambulated almost a mile yesterday. Still feeling some nausea however controlled with zofran. Tolerating clear liquid diet. Did have a small bowel movement yesterday and again this morning but still feeling a bit bloated. Voiding well.     Physical Exam:   Temp:  [97.5  F (36.4  C)-98.4  F (36.9  C)] 97.5  F (36.4  C)  Pulse:  [56-64] 56  Resp:  [16-18] 16  BP: (113-125)/(64-86) 115/70  SpO2:  [98 %-100 %] 98 %  General: Alert, oriented, appears comfortable, NAD.  Respiratory: Breathing non labored  Abdomen: Abdomen is soft, distended. Incision is clean, dry and intact.     Data:   All laboratory and imaging data in the past 24 hours reviewed  I/O last 3 completed shifts:  In: 2858 [P.O.:1980; I.V.:878]  Out: 2800 [Urine:2800]  Recent Labs   Lab Test 04/12/25  0456 04/11/25  0543 04/10/25  0631 04/09/25  1656   WBC 4.7 6.0 10.7 11.4*   HGB 11.7* 11.8* 12.6* 14.3   * 141* 169 174   INR  --  1.14 1.20* 1.12      Recent Labs   Lab Test 04/12/25  0456 04/11/25  0543 04/10/25  0631    138 140   POTASSIUM 4.6 4.5 3.9   CHLORIDE 104 103 105   CO2 28 27 25   BUN 11.7 8.4 9.2   CR 0.84 0.86 0.80   ANIONGAP 8 8 10   KIRSTEN 9.1 8.9 8.6*   GLC 94 92 104*      Recent Labs   Lab Test 04/09/25  1656   PROTTOTAL 6.5   ALBUMIN 4.4   BILITOTAL 0.9   ALKPHOS 50   *   *     Assessment and Plan:     Ivan Goyal is a 34 year old year old male with history of hypertension and hepatocellular adenoma.     POD 3 s/p laparotomy and partial central hepatectomy (segment 4B/5) with en bloc cholecystectomy with Dr. Lazo 4/9/25.     Awaiting return of bowel function. Having some ongoing nausea and bloating though has had 2 small bowel movements since. Will resume some IV fluids.     - PVC per anesthesia. Acetaminophen scheduled, robaxin scheduled, dilaudid PCA. Abdominal binder prn for comfort.   - Home  lexapro resumed.   - Incentive spirometry. Out of bed and ambulation.   - Ileus: Advance to FLD, discontinue IVF. Can advance to regular diet if tolerating FLD  - Miralax. Suppository.   - Anemia secondary to surgical blood loss and dilution, stable  - Elevated LFTs expected after partial hepatectomy, improving  - Lovenox for DVT prophylaxis, will not continue on discharge  - PT/OT, Patient to connect with  today regarding insurance status    Patient seen and discussed with staff, Dr. Brandon Schumacher MD   General Surgery, PGY-3

## 2025-04-12 NOTE — PLAN OF CARE
Goal Outcome Evaluation:      Plan of Care Reviewed With: patient    Overall Patient Progress: improvingOverall Patient Progress: improving    Outcome Evaluation: POD 3    0402-3530. Alert, oriented, up with stand-by assistance and IV pole. Ambulating in unit halls. Vital signs stable. Pain managed with Dilaudid PCA pump, ropivacaine perineural infusion, and scheduled Tylenol and Robaxin. Diet advanced to full liquids; tolerating without nausea Voiding spontaneously with adequate urine output. Passing flatus; small BM today. Abdominal incision open to air; abdominal binder ON. Perineural catheter site dressing is clean, dry, and intact. L PIV infusing PCA pump and carrier fluid; R PIV saline locked.

## 2025-04-13 ENCOUNTER — APPOINTMENT (OUTPATIENT)
Dept: OCCUPATIONAL THERAPY | Facility: CLINIC | Age: 35
End: 2025-04-13
Attending: SURGERY
Payer: COMMERCIAL

## 2025-04-13 PROBLEM — D13.4 HEPATIC ADENOMA: Status: RESOLVED | Noted: 2025-04-09 | Resolved: 2025-04-13

## 2025-04-13 LAB
ALBUMIN SERPL BCG-MCNC: 3.7 G/DL (ref 3.5–5.2)
ALP SERPL-CCNC: 44 U/L (ref 40–150)
ALT SERPL W P-5'-P-CCNC: 154 U/L (ref 0–70)
ANION GAP SERPL CALCULATED.3IONS-SCNC: 9 MMOL/L (ref 7–15)
AST SERPL W P-5'-P-CCNC: 65 U/L (ref 0–45)
BILIRUB SERPL-MCNC: 0.4 MG/DL
BUN SERPL-MCNC: 9.3 MG/DL (ref 6–20)
CALCIUM SERPL-MCNC: 9.1 MG/DL (ref 8.8–10.4)
CHLORIDE SERPL-SCNC: 104 MMOL/L (ref 98–107)
CREAT SERPL-MCNC: 0.79 MG/DL (ref 0.67–1.17)
EGFRCR SERPLBLD CKD-EPI 2021: >90 ML/MIN/1.73M2
ERYTHROCYTE [DISTWIDTH] IN BLOOD BY AUTOMATED COUNT: 12.5 % (ref 10–15)
GLUCOSE SERPL-MCNC: 90 MG/DL (ref 70–99)
HCO3 SERPL-SCNC: 27 MMOL/L (ref 22–29)
HCT VFR BLD AUTO: 37.1 % (ref 40–53)
HGB BLD-MCNC: 12.3 G/DL (ref 13.3–17.7)
MCH RBC QN AUTO: 28.5 PG (ref 26.5–33)
MCHC RBC AUTO-ENTMCNC: 33.2 G/DL (ref 31.5–36.5)
MCV RBC AUTO: 86 FL (ref 78–100)
PHOSPHATE SERPL-MCNC: 3.8 MG/DL (ref 2.5–4.5)
PLATELET # BLD AUTO: 147 10E3/UL (ref 150–450)
POTASSIUM SERPL-SCNC: 3.9 MMOL/L (ref 3.4–5.3)
PROT SERPL-MCNC: 5.8 G/DL (ref 6.4–8.3)
RBC # BLD AUTO: 4.32 10E6/UL (ref 4.4–5.9)
SODIUM SERPL-SCNC: 140 MMOL/L (ref 135–145)
WBC # BLD AUTO: 4.6 10E3/UL (ref 4–11)

## 2025-04-13 PROCEDURE — 97530 THERAPEUTIC ACTIVITIES: CPT | Mod: GO

## 2025-04-13 PROCEDURE — 250N000013 HC RX MED GY IP 250 OP 250 PS 637

## 2025-04-13 PROCEDURE — 250N000013 HC RX MED GY IP 250 OP 250 PS 637: Performed by: STUDENT IN AN ORGANIZED HEALTH CARE EDUCATION/TRAINING PROGRAM

## 2025-04-13 PROCEDURE — 85027 COMPLETE CBC AUTOMATED: CPT | Performed by: STUDENT IN AN ORGANIZED HEALTH CARE EDUCATION/TRAINING PROGRAM

## 2025-04-13 PROCEDURE — 99233 SBSQ HOSP IP/OBS HIGH 50: CPT | Mod: GC | Performed by: ANESTHESIOLOGY

## 2025-04-13 PROCEDURE — 36415 COLL VENOUS BLD VENIPUNCTURE: CPT | Performed by: STUDENT IN AN ORGANIZED HEALTH CARE EDUCATION/TRAINING PROGRAM

## 2025-04-13 PROCEDURE — 80053 COMPREHEN METABOLIC PANEL: CPT | Performed by: STUDENT IN AN ORGANIZED HEALTH CARE EDUCATION/TRAINING PROGRAM

## 2025-04-13 PROCEDURE — 84100 ASSAY OF PHOSPHORUS: CPT | Performed by: STUDENT IN AN ORGANIZED HEALTH CARE EDUCATION/TRAINING PROGRAM

## 2025-04-13 PROCEDURE — 97535 SELF CARE MNGMENT TRAINING: CPT | Mod: GO

## 2025-04-13 PROCEDURE — 250N000013 HC RX MED GY IP 250 OP 250 PS 637: Performed by: PHYSICIAN ASSISTANT

## 2025-04-13 PROCEDURE — 120N000002 HC R&B MED SURG/OB UMMC

## 2025-04-13 PROCEDURE — 250N000011 HC RX IP 250 OP 636: Performed by: STUDENT IN AN ORGANIZED HEALTH CARE EDUCATION/TRAINING PROGRAM

## 2025-04-13 RX ORDER — OXYCODONE HYDROCHLORIDE 5 MG/1
5-10 TABLET ORAL EVERY 4 HOURS PRN
Status: DISCONTINUED | OUTPATIENT
Start: 2025-04-13 | End: 2025-04-14 | Stop reason: HOSPADM

## 2025-04-13 RX ORDER — HYDROMORPHONE HCL IN WATER/PF 6 MG/30 ML
.2-.4 PATIENT CONTROLLED ANALGESIA SYRINGE INTRAVENOUS EVERY 4 HOURS PRN
Status: DISCONTINUED | OUTPATIENT
Start: 2025-04-13 | End: 2025-04-14

## 2025-04-13 RX ORDER — ONDANSETRON 4 MG/1
4 TABLET, FILM COATED ORAL EVERY 6 HOURS PRN
Qty: 10 TABLET | Refills: 0 | Status: SHIPPED | OUTPATIENT
Start: 2025-04-13

## 2025-04-13 RX ORDER — OXYCODONE HYDROCHLORIDE 5 MG/1
5 TABLET ORAL EVERY 4 HOURS PRN
Status: DISCONTINUED | OUTPATIENT
Start: 2025-04-13 | End: 2025-04-13

## 2025-04-13 RX ORDER — FLUTICASONE PROPIONATE 50 MCG
1 SPRAY, SUSPENSION (ML) NASAL DAILY
Status: DISCONTINUED | OUTPATIENT
Start: 2025-04-13 | End: 2025-04-14 | Stop reason: HOSPADM

## 2025-04-13 RX ORDER — METHOCARBAMOL 750 MG/1
750 TABLET, FILM COATED ORAL 4 TIMES DAILY
Status: DISCONTINUED | OUTPATIENT
Start: 2025-04-13 | End: 2025-04-14 | Stop reason: HOSPADM

## 2025-04-13 RX ORDER — HYDROMORPHONE HYDROCHLORIDE 1 MG/ML
0.3 INJECTION, SOLUTION INTRAMUSCULAR; INTRAVENOUS; SUBCUTANEOUS EVERY 4 HOURS PRN
Status: DISCONTINUED | OUTPATIENT
Start: 2025-04-13 | End: 2025-04-13

## 2025-04-13 RX ORDER — OXYCODONE HYDROCHLORIDE 5 MG/1
5-10 TABLET ORAL EVERY 4 HOURS PRN
Qty: 20 TABLET | Refills: 0 | Status: SHIPPED | OUTPATIENT
Start: 2025-04-13 | End: 2025-04-14

## 2025-04-13 RX ORDER — FLUTICASONE PROPIONATE 50 MCG
1 SPRAY, SUSPENSION (ML) NASAL DAILY
Status: DISCONTINUED | OUTPATIENT
Start: 2025-04-14 | End: 2025-04-13

## 2025-04-13 RX ADMIN — FLUTICASONE PROPIONATE 1 SPRAY: 50 SPRAY, METERED NASAL at 23:47

## 2025-04-13 RX ADMIN — OXYCODONE HYDROCHLORIDE 5 MG: 5 TABLET ORAL at 21:28

## 2025-04-13 RX ADMIN — ESCITALOPRAM OXALATE 10 MG: 10 TABLET ORAL at 07:51

## 2025-04-13 RX ADMIN — METHOCARBAMOL 750 MG: 750 TABLET ORAL at 19:52

## 2025-04-13 RX ADMIN — POLYETHYLENE GLYCOL 3350 17 G: 17 POWDER, FOR SOLUTION ORAL at 07:51

## 2025-04-13 RX ADMIN — OXYCODONE HYDROCHLORIDE 5 MG: 5 TABLET ORAL at 15:27

## 2025-04-13 RX ADMIN — ONDANSETRON 4 MG: 4 TABLET, ORALLY DISINTEGRATING ORAL at 08:01

## 2025-04-13 RX ADMIN — ACETAMINOPHEN 975 MG: 325 TABLET, FILM COATED ORAL at 23:44

## 2025-04-13 RX ADMIN — ACETAMINOPHEN 975 MG: 325 TABLET, FILM COATED ORAL at 07:51

## 2025-04-13 RX ADMIN — METHOCARBAMOL 500 MG: 500 TABLET ORAL at 07:51

## 2025-04-13 RX ADMIN — ACETAMINOPHEN 975 MG: 325 TABLET, FILM COATED ORAL at 15:27

## 2025-04-13 RX ADMIN — METHOCARBAMOL 750 MG: 750 TABLET ORAL at 12:26

## 2025-04-13 RX ADMIN — METHOCARBAMOL 750 MG: 750 TABLET ORAL at 15:27

## 2025-04-13 ASSESSMENT — ACTIVITIES OF DAILY LIVING (ADL)
ADLS_ACUITY_SCORE: 37
ADLS_ACUITY_SCORE: 38
ADLS_ACUITY_SCORE: 37
ADLS_ACUITY_SCORE: 38
ADLS_ACUITY_SCORE: 37
ADLS_ACUITY_SCORE: 38
ADLS_ACUITY_SCORE: 37
ADLS_ACUITY_SCORE: 38
ADLS_ACUITY_SCORE: 37

## 2025-04-13 NOTE — PROGRESS NOTES
"Pain Service Progress Note  [facility]  Date: 04/13/2025       Patient Name: Ivan Goyal  MRN: [id number]  Age: 34 year old  Sex: male      Assessment:  Ivan Goyal is a 34 year old year old male with history of hypertension and hepatocellular adenoma     Procedure: s/p partial central hepatectomy and cholecystectomy w/Dr. Lazo   Date of Surgery: 4/9/25.    Date of Catheter Placement: 4/9/25      Plan/Recommendations:  1. Regional Anesthesia/Analgesia  -Continuous Catheter Type/Site: right paravertebral (PV) T7-8  Infusate: 0.2 % ropivacaine at   Programmed Intermittent Bolus (PIB) at 10 mL Q60 min via each catheter, total infusion rate of 10 mL/hr    Plan to maintain catheter, max of 7 days    2. Anticoagulation  -currently on lovenox 40mg daily   -Please contact Inpatient Pain Service before ordering or making any anticoagulation changes       3. Multimodal Analgesia  - Pause trial will remove catheter this afternoon if successful    Pain Service will continue to follow.    Discussed with attending anesthesiologist    Mark Vences MD      Overnight Events: None    Subjective:   \"I have been moving a lot.\"    Nausea: Yes  Vomiting: No  Pruritus: No  Symptoms of LAST: No    Pain Location:  Abdomen    Pain Intensity:    Pain at Rest: 3/10  Pain with Activity: 4/10  Comfort Goal: 4/10  Satisfied with your level of pain control: Yes    Diet: Diet  Regular Diet Adult  Snacks/Supplements Adult: Ensure Enlive; Between Meals    Relevant Labs:  Recent Labs   Lab Test 04/10/25  0631   INR 1.20*      BUN 9.2       Physical Exam:  Vitals: /83 (BP Location: Left arm)   Pulse 58   Temp 36.6  C (97.9  F) (Oral)   Resp 18   Ht 1.905 m (6' 3\")   Wt 78.3 kg (172 lb 9.6 oz)   SpO2 99%   BMI 21.57 kg/m      Physical Exam:   Orientation:  Alert, oriented, and in no acute distress: Yes  Sedation: No     Catheter Site:   Catheter entry site is clean/dry/intact: Yes     Tender: " "No      Relevant Medications:  Current Pain Medications:  Medications related to Pain Management (From now, onward)      Start     Dose/Rate Route Frequency Ordered Stop    04/10/25 0800  polyethylene glycol (MIRALAX) Packet 17 g         17 g Oral DAILY 04/10/25 0647      04/10/25 0800  methocarbamol (ROBAXIN) tablet 500 mg         500 mg Oral 4 TIMES DAILY 04/10/25 0649      04/10/25 0730  acetaminophen (TYLENOL) tablet 650 mg         650 mg Oral EVERY 8 HOURS 04/10/25 0717      04/09/25 1622  lidocaine 1 % 0.1-1 mL         0.1-1 mL Other EVERY 1 HOUR PRN 04/09/25 1622      04/09/25 1622  lidocaine (LMX4) cream          Topical EVERY 1 HOUR PRN 04/09/25 1622      04/09/25 1400  HYDROmorphone (DILAUDID) PCA 0.2 mg/mL OPIOID NAIVE (age less than 65 years)          Intravenous CONTINUOUS 04/09/25 1342      04/09/25 0830  ROPivacaine 0.2% in sodium chloride 0.9% PERINEURAL infusion          Perineural Continuous Nerve Block 04/09/25 0825              Primary Service Contacted with Recommendations? Yes    Please see A&P for additional details of medical decision making.  Medical complexity over the past 24 hours:  - Parenteral (IV) CONTROLLED SUBSTANCES ordered  - Intensive monitoring for MEDICATION TOXICITY    Acute Inpatient Pain Service [facility]  Hours of pain coverage 24/7   Page via Amcom- Please Page the Pain Team Via Amcom: \"PAIN MANAGEMENT ACUTE INPATIENT/ [facility]\"            "

## 2025-04-13 NOTE — PLAN OF CARE
Goal Outcome Evaluation:      Plan of Care Reviewed With: patient    Overall Patient Progress: improvingOverall Patient Progress: improving         Nursing note    Pain/Comfort: Patient having mild to moderate abdominal incisional pain, treated with ropivacaine continous nerve block, and PCA dilaudid pump.     Primary problem:  Hepatic adenocarcinoma  Assessments/Progress: Patient is calm, cooperative with some anxiety observed. A&Ox4. Vitals indicate bradycardia. Patient baseline. Patient ambulated unit several times, tolerating activity. Patient moving OOB independently.  Staff assisting patient with tasks requiring bending. Abdominal incision has old dried drainage, open to air. Abdominal binder is off. Patient using SCDs while in bed. Trace amount of bilateral leg edema present. Legs elevated while in bed. Patient sleep and rest promoted.     Priority nursing care for next shift:  POC  Discharge plan/ barriers to discharge: Pending

## 2025-04-13 NOTE — PLAN OF CARE
Goal Outcome Evaluation:      Plan of Care Reviewed With: patient    Overall Patient Progress: improvingOverall Patient Progress: improving    Goal Outcome Evaluation: POD 4    3356-7330. Alert, oriented, up independently. Ambulating frequently in unit halls. Vital signs stable. Dilaudid pain pump discontinued today and ropivacaine perineural infusion is currently on a trial hold. Pain managed with scheduled Tylenol and Robaxin and PRN oxycodone x 1 dose. Diet advanced regular; tolerating without nausea. Voiding spontaneously with adequate urine output. Multiple BMs today; stool is becoming more formed. Abdominal incision open to air; abdominal binder on when up and walking. Perineural catheter site dressing is clean, dry, and intact; anesthesiology to return and remove catheter this evening. R PIV saline locked.

## 2025-04-13 NOTE — DISCHARGE SUMMARY
[facility] Discharge Summary    Ivan Goyal MRN# [id number]   Age: 34 year old YOB: 1990     Date of Admission:  4/9/2025  Date of Discharge::  4/14/2025  Admitting Physician:  Brian Lazo MD  Discharge Physician:  Brian Lazo MD     PCP:  Nadya Pimentel    Disposition: Patient discharged to home in stable condition.    Admission Diagnosis:  Hepatic adenoma  Hypertension  Anxiety    Discharge Diagnosis:  Hepatic adenoma  Ileus   Elevated liver enzymes  Anemia   Hypertension  Anxiety    Discharge medications  Current Discharge Medication List        START taking these medications    Details   acetaminophen (TYLENOL) 325 MG tablet Take 2-3 tablets (650-975 mg) by mouth every 8 hours as needed for mild pain.  Qty: 100 tablet, Refills: 0    Associated Diagnoses: Postoperative pain      methocarbamol (ROBAXIN) 500 MG tablet Take 1.5 tablets (750 mg) by mouth every 6 hours as needed for muscle spasms.  Qty: 20 tablet, Refills: 0    Associated Diagnoses: Postoperative pain      ondansetron (ZOFRAN) 4 MG tablet Take 1 tablet (4 mg) by mouth every 6 hours as needed for nausea.  Qty: 10 tablet, Refills: 0    Associated Diagnoses: Hepatic adenoma      oxyCODONE (ROXICODONE) 5 MG tablet Take 1 tablet (5 mg) by mouth every 6 hours as needed for moderate pain.  Qty: 20 tablet, Refills: 0    Associated Diagnoses: Hepatic adenoma      polyethylene glycol (MIRALAX) 17 GM/Dose powder Take 17 g by mouth daily as needed for constipation.  Qty: 510 g, Refills: 0    Associated Diagnoses: Postoperative pain           CONTINUE these medications which have NOT CHANGED    Details   amLODIPine (NORVASC) 5 MG tablet Take 1 tablet (5 mg) by mouth daily.  Qty: 90 tablet, Refills: 3    Associated Diagnoses: Benign essential hypertension      cetirizine (ZYRTEC) 10 MG tablet Take 10 mg by mouth daily as needed for allergies.      cholecalciferol (VITAMIN D3) 25 mcg (1000 units) capsule  Take 1 capsule by mouth every morning.      escitalopram (LEXAPRO) 5 MG tablet Take 2 tablets (10 mg) by mouth daily.  Qty: 180 tablet, Refills: 2    Associated Diagnoses: Generalized anxiety disorder      fish oil-omega-3 fatty acids 500 MG capsule Take 500 mg by mouth every morning.           Follow up, Special Instructions:  After Care       Future Labs/Procedures    Activity     Comments:    Your activity upon discharge: Frequent out of bed and ambulation is recommended. Avoid lifting more than 10 lbs for 6 weeks.    Diet     Comments:    Follow this diet upon discharge: Regular diet. Protein supplements.    Discharge Instructions     Comments:    If your travel plans upon discharge include prolonged periods of sitting (a lengthy car or plane ride), it is highly beneficial to get up and walk at least once per hour to help prevent swelling and blood clots.     You may shower after operation, let warm soapy water run over incision and pat dry. Do not submerge, soak, or scrub incision. The glue over the incision will fall off on its own. After 4 weeks, you may use vaseline or aquaphor to gently massage your scar. You may use vitamin E oil or silicone scar tape to aid in your scar, however these do not have any proven benefit.     Take incentive spirometer home for continued frequent use.    If you are discharged with narcotic pain medications please take them only as needed and do not operate a car or heavy machinery for 24 hours after taking them.  Narcotic pain medications like oxycodone are constipating. Please ensure that you are drinking adequate amounts of fluids. Take Miralax as needed for constipation.     Do not drive until you are off narcotic pain medication, you can withstand pressing the brake pedal quickly and fully without pain, and you are not distracted by pain. No lifting greater than 10-20lb for the first 4 weeks, can increase as tolerated after.     Call for fever greater than 101.5, chills,  "worsening pain, red skin around incision, drainage from incision, increased swelling from the incision, bleeding from the incision that is not controlled with light pressure, inability to tolerate diet,new nausea/vomiting or other new/worsening symptoms.     - If you a patient at the [facility] or [facility] and have urgent nursing needs 8:00 am - 4:00 pm contact our Triage at [phone], option 5 and option 2.   - For after hours questions or concerns you can contact the on-call surgical oncology resident (nights and weekends [phone] and ask \"I would like to page the Surgical Oncology Resident on call.\").   - In emergencies, call 911. If your problem is necessitating an ER visit our first preference is that you return to the [facility] ER if able.     Follow Up:  Follow up in clinic with Dr. Lazo 4/22/25. You should be called to make an appointment within 3 business days. If you are not contacted, call [phone] to make an appointment.          Procedures:  4/9/25 Dr. Lazo  1. Laparotomy  2. Intraoperative Liver Ultrasound  3. Partial Central Hepatectomy (Segment 4b/5) w/ En Bloc Cholecystectomy    Consultations:  PHYSICAL THERAPY ADULT IP CONSULT  OCCUPATIONAL THERAPY ADULT IP CONSULT  NUTRITION SERVICES ADULT IP CONSULT  CARE MANAGEMENT / SOCIAL WORK IP CONSULT    Brief HPI:  Ivan Goyal is a 34 year old year old male with history of allergies, hypertension, and hepatic adenoma.     Hospital Course:  The patient was admitted and underwent laparotomy and partial central hepatectomy (segment 4B/5) with en bloc cholecystectomy with Dr. Lazo 4/9/25. The patient tolerated the procedure well. The patient recovered well with no post-operative complications. He had an ileus that resolved with conservative management. His diet was advanced as bowel function returned and as tolerated. Elevated liver enzymes were expected after partial hepatectomy. LFTs were monitored " and down trending. Post op anemia secondary to surgical blood loss and dilution, hemoglobin was monitored and stable. Prior to discharge pain was controlled with oral pain medication and the patient was able to ambulate and void without difficulty. The patient received appropriate education post operatively. On POD #5 the patient was discharged to home.    - Acetaminophen scheduled, robaxin scheduled, oxycodone prn. Abdominal binder prn for comfort.   - Home lexapro and cetirizine resumed.   - Incentive spirometry. Out of bed and ambulation.   - Regular diet. Protein supplement available.   - Miralax.   - Follow up with Dr. Lazo 4/22/25.     Surgical pathology  Pending     Areli Bronson PA-C

## 2025-04-13 NOTE — PROGRESS NOTES
[facility]  Surgical Oncology Progress Note    Interval History:  POD 4  Pain controlled. Ambulated 30 laps around the unit yesterday. Still feeling some nausea however controlled with zofran x1 earlier in the day and did not need any more later. Tolerating full liquid diet. Did have a small bowel movement yesterday but no more, passing gas. Voiding well.     Physical Exam:   Temp:  [97.4  F (36.3  C)-98.1  F (36.7  C)] 97.5  F (36.4  C)  Pulse:  [51-65] 51  Resp:  [16-20] 16  BP: (115-129)/(72-84) 115/73  SpO2:  [98 %-100 %] 98 %  General: Alert, oriented, appears comfortable, NAD.  Respiratory: Breathing non labored  Abdomen: Abdomen is soft, distended. Incision is clean, dry and intact.     Data:   All laboratory and imaging data in the past 24 hours reviewed  I/O last 3 completed shifts:  In: 2073 [P.O.:2050; I.V.:23]  Out: 4285 [Urine:4285]  Recent Labs   Lab Test 04/12/25  0456 04/11/25  0543 04/10/25  0631 04/09/25  1656   WBC 4.7 6.0 10.7 11.4*   HGB 11.7* 11.8* 12.6* 14.3   * 141* 169 174   INR  --  1.14 1.20* 1.12      Recent Labs   Lab Test 04/12/25  0456 04/11/25  0543 04/10/25  0631    138 140   POTASSIUM 4.6 4.5 3.9   CHLORIDE 104 103 105   CO2 28 27 25   BUN 11.7 8.4 9.2   CR 0.84 0.86 0.80   ANIONGAP 8 8 10   KIRSTEN 9.1 8.9 8.6*   GLC 94 92 104*      Recent Labs   Lab Test 04/09/25  1656   PROTTOTAL 6.5   ALBUMIN 4.4   BILITOTAL 0.9   ALKPHOS 50   *   *     Assessment and Plan:     Ivan Goyal is a 34 year old year old male with history of hypertension and hepatocellular adenoma.     POD 3 s/p laparotomy and partial central hepatectomy (segment 4B/5) with en bloc cholecystectomy with Dr. Lazo 4/9/25.     Awaiting better return of bowel function however consistently passing gas. Will advance to regular diet. Also will plan to discontinue PCA and paravertebrals today, advance to IV/PO PRN pain options.    - PVC per anesthesia, pause trial  initiated. Acetaminophen scheduled, robaxin scheduled and increased to 750mg, dilaudid IV PRN, oxycodone PO PRN, Abdominal binder prn for comfort.   - Home lexapro resumed.   - Incentive spirometry. Out of bed and ambulation.   - Ileus, improving. Advance to regular diet. Daily miralax  - Anemia secondary to surgical blood loss and dilution, stable  - Elevated LFTs expected after partial hepatectomy, improving  - Lovenox for DVT prophylaxis held today for possible PVC removal, do not need to continue on discharge  - Anticipate discharge to home tomorrow if tolerating diet and pain controlled     Patient seen and discussed with staff, Dr. Brandon Schumacher MD   General Surgery, PGY-3

## 2025-04-14 VITALS
BODY MASS INDEX: 21.46 KG/M2 | WEIGHT: 172.6 LBS | RESPIRATION RATE: 16 BRPM | HEIGHT: 75 IN | HEART RATE: 53 BPM | OXYGEN SATURATION: 99 % | SYSTOLIC BLOOD PRESSURE: 115 MMHG | DIASTOLIC BLOOD PRESSURE: 76 MMHG | TEMPERATURE: 97.4 F

## 2025-04-14 PROCEDURE — 250N000013 HC RX MED GY IP 250 OP 250 PS 637: Performed by: PHYSICIAN ASSISTANT

## 2025-04-14 PROCEDURE — 250N000013 HC RX MED GY IP 250 OP 250 PS 637: Performed by: STUDENT IN AN ORGANIZED HEALTH CARE EDUCATION/TRAINING PROGRAM

## 2025-04-14 PROCEDURE — 250N000013 HC RX MED GY IP 250 OP 250 PS 637

## 2025-04-14 PROCEDURE — 99232 SBSQ HOSP IP/OBS MODERATE 35: CPT | Performed by: ANESTHESIOLOGY

## 2025-04-14 RX ORDER — METHOCARBAMOL 500 MG/1
750 TABLET, FILM COATED ORAL EVERY 6 HOURS PRN
Qty: 20 TABLET | Refills: 0 | Status: SHIPPED | OUTPATIENT
Start: 2025-04-14

## 2025-04-14 RX ORDER — ENOXAPARIN SODIUM 100 MG/ML
40 INJECTION SUBCUTANEOUS EVERY 24 HOURS
Status: DISCONTINUED | OUTPATIENT
Start: 2025-04-14 | End: 2025-04-14 | Stop reason: HOSPADM

## 2025-04-14 RX ORDER — CETIRIZINE HYDROCHLORIDE 10 MG/1
10 TABLET ORAL DAILY PRN
Status: DISCONTINUED | OUTPATIENT
Start: 2025-04-14 | End: 2025-04-14 | Stop reason: HOSPADM

## 2025-04-14 RX ORDER — OXYCODONE HYDROCHLORIDE 5 MG/1
5 TABLET ORAL EVERY 6 HOURS PRN
Qty: 20 TABLET | Refills: 0 | Status: SHIPPED | OUTPATIENT
Start: 2025-04-14

## 2025-04-14 RX ADMIN — ESCITALOPRAM OXALATE 10 MG: 10 TABLET ORAL at 07:41

## 2025-04-14 RX ADMIN — METHOCARBAMOL 750 MG: 750 TABLET ORAL at 07:41

## 2025-04-14 RX ADMIN — ACETAMINOPHEN 975 MG: 325 TABLET, FILM COATED ORAL at 07:40

## 2025-04-14 RX ADMIN — POLYETHYLENE GLYCOL 3350 17 G: 17 POWDER, FOR SOLUTION ORAL at 07:40

## 2025-04-14 ASSESSMENT — ACTIVITIES OF DAILY LIVING (ADL)
ADLS_ACUITY_SCORE: 35

## 2025-04-14 NOTE — PROGRESS NOTES
"Pain Service Progress Note  [facility]  Date: 04/14/2025       Patient Name: Ivan Goyal  MRN: [id number]  Age: 34 year old  Sex: male      Assessment:  Ivan Goyal is a 34 year old year old male with history of hypertension and hepatocellular adenoma     Procedure: s/p partial central hepatectomy and cholecystectomy w/Dr. Lazo   Date of Surgery: 4/9/25.    Date of Catheter Placement: 4/9/25      Plan/Recommendations:  1. Regional Anesthesia/Analgesia  -Continuous Catheter Type/Site: right paravertebral (PV) T7-8  Infusate: 0.2 % ropivacaine at   Programmed Intermittent Bolus (PIB) at 10 mL Q60 min via each catheter, total infusion rate of 10 mL/hr    Catheter removed. Tip intact.    2. Anticoagulation  -Please contact Inpatient Pain Service before ordering or making any anticoagulation changes    Wait 4 hours after catheter removal for resumption of enoxaparin       3. Multimodal Analgesia  - Per primary service    Pain Service will sign off.    Discussed with attending anesthesiologist    Jcarlos Ramirez MD      Overnight Events: None    Subjective:   \"I'm ready to go.\"    Nausea: No  Vomiting: No  Pruritus: No  Symptoms of LAST: No    Pain Location:  Abdomen    Pain Intensity:    Pain at Rest: 2/10  Pain with Activity: 4/10  Comfort Goal: 4/10  Satisfied with your level of pain control: Yes    Diet: Diet  Regular Diet Adult  Snacks/Supplements Adult: Ensure Enlive; Between Meals    Relevant Labs:  Recent Labs   Lab Test 04/10/25  0631   INR 1.20*      BUN 9.2       Physical Exam:  Vitals: /76 (BP Location: Left arm)   Pulse 53   Temp 97.4  F (36.3  C) (Oral)   Resp 16   Ht 1.905 m (6' 3\")   Wt 78.3 kg (172 lb 9.6 oz)   SpO2 99%   BMI 21.57 kg/m      Physical Exam:   Orientation:  Alert, oriented, and in no acute distress: Yes  Sedation: No     Catheter Site:   Catheter entry site is clean/dry/intact: Yes     Tender: No      Relevant Medications:  Current " "Pain Medications:  Medications related to Pain Management (From now, onward)      Start     Dose/Rate Route Frequency Ordered Stop    04/10/25 0800  polyethylene glycol (MIRALAX) Packet 17 g         17 g Oral DAILY 04/10/25 0647      04/10/25 0800  methocarbamol (ROBAXIN) tablet 500 mg         500 mg Oral 4 TIMES DAILY 04/10/25 0649      04/10/25 0730  acetaminophen (TYLENOL) tablet 650 mg         650 mg Oral EVERY 8 HOURS 04/10/25 0717      04/09/25 1622  lidocaine 1 % 0.1-1 mL         0.1-1 mL Other EVERY 1 HOUR PRN 04/09/25 1622      04/09/25 1622  lidocaine (LMX4) cream          Topical EVERY 1 HOUR PRN 04/09/25 1622      04/09/25 1400  HYDROmorphone (DILAUDID) PCA 0.2 mg/mL OPIOID NAIVE (age less than 65 years)          Intravenous CONTINUOUS 04/09/25 1342      04/09/25 0830  ROPivacaine 0.2% in sodium chloride 0.9% PERINEURAL infusion          Perineural Continuous Nerve Block 04/09/25 0825              Primary Service Contacted with Recommendations? Yes    Please see A&P for additional details of medical decision making.  Medical complexity over the past 24 hours:  - Parenteral (IV) CONTROLLED SUBSTANCES ordered  - Intensive monitoring for MEDICATION TOXICITY    Acute Inpatient Pain Service [facility]  Hours of pain coverage 24/7   Page via Amcom- Please Page the Pain Team Via Amcom: \"PAIN MANAGEMENT ACUTE INPATIENT/ [facility]\"            "

## 2025-04-14 NOTE — PROGRESS NOTES
Nursing Focus: Discharge    D: Patient discharged to home at 0940. Patient Ivan and accompanied by parents.    I: Discharge prescriptions sent to discharge pharmacy to be filled. All discharge medications and instructions reviewed with him by bedside RN. Patient instructed to call clinic triage nurse if he experiences a fever >100.4, uncontrolled nausea, vomiting, diarrhea, or pain; or experiences any signs or symptoms of bleeding. Other phone numbers to call with questions or concerns after discharge reviewed. PIV removed. Education completed.    A: Ivan verbalized understanding of discharge medications and instructions. Patient will  medications at discharge pharmacy.     P: Patient to follow-up in clinic.

## 2025-04-14 NOTE — PLAN OF CARE
Goal Outcome Evaluation:      Plan of Care Reviewed With: patient    Overall Patient Progress: improvingOverall Patient Progress: improving     Nursing note    Pain/Comfort: Patient is having mild to severe 3/10-7/10 abdominal incisional pain, which is aggravated with coughing and increased activity and relieved by rest and medication. Pain was managed overnight with PRN oral oxycodone 5mg.     Primary problem:  Post op  Hepatocellular adenoma  Assessments/Progress: Vital signs stable overnight.  Patient continues to ambulate frequently in nursing unit. Patient is improving in cares. Tolerating progression to regular diet. Skin assessment indicates no changes from previous. Abdominal incision edges approximated, dried maroon drainage, open to air. Life transitions discussed with patient including self body image and physical changes post operatively. Nursing educated patient on abdominal anatomy. Patient was receptive of education, and appeared reassured with counseling. Patient reported some new upper respiratory and nasal congestion, lung sounds clear anterior, lateral and posteriorly. Breath sounds diminished in bases bilaterally. Patient having intermittent cough. Crosscover provider Preeti Arreguin MD paged. New orders received for flonase nasal spray, which was administered at bedside.    Priority nursing care for next shift: Plan of care  Discharge plan/ barriers to discharge: Pending

## 2025-04-14 NOTE — PROGRESS NOTES
[facility]  Surgical Oncology Progress Note    Interval History:  POD 5  No acute events overnight. Overall pain is controlled. He does have surgical site pain with cough. He feels like he has some post nasal drip contributing to his cough. He does take allergy medication at home. He is tolerating a regular diet without nausea. He is passing flatus and had several bowel movement yesterday. Ambulating.     Physical Exam:   Temp:  [97.4  F (36.3  C)-98.4  F (36.9  C)] 97.4  F (36.3  C)  Pulse:  [53-64] 53  Resp:  [16-18] 16  BP: (112-130)/(66-83) 115/76  SpO2:  [97 %-99 %] 99 %  General: Alert, oriented, appears comfortable, NAD.  Respiratory: breathing non labored  Abdomen: Abdomen is soft, non-tender, non-distended. Incision is clean, dry and intact.     Data:   All laboratory and imaging data in the past 24 hours reviewed  I/O last 3 completed shifts:  In: 1705 [P.O.:1680; I.V.:25]  Out: 4325 [Urine:4325]  Recent Labs   Lab Test 04/13/25  0548 04/12/25  0456 04/11/25  0543 04/10/25  0631 04/09/25  1656   WBC 4.6 4.7 6.0 10.7 11.4*   HGB 12.3* 11.7* 11.8* 12.6* 14.3   * 137* 141* 169 174   INR  --   --  1.14 1.20* 1.12      Recent Labs   Lab Test 04/13/25  0548 04/12/25  0456 04/11/25  0543    140 138   POTASSIUM 3.9 4.6 4.5   CHLORIDE 104 104 103   CO2 27 28 27   BUN 9.3 11.7 8.4   CR 0.79 0.84 0.86   ANIONGAP 9 8 8   KIRSTEN 9.1 9.1 8.9   GLC 90 94 92      Recent Labs   Lab Test 04/13/25  0548   PROTTOTAL 5.8*   ALBUMIN 3.7   BILITOTAL 0.4   ALKPHOS 44   AST 65*   *     Assessment and Plan:     Ivan Goyal is a 34 year old year old male with history of allergies, hypertension, and hepatic adenoma.     POD 5  s/p laparotomy and partial central hepatectomy (segment 4B/5) with en bloc cholecystectomy with Dr. Lazo 4/9/25.      - PVC per anesthesia, plan for removal this morning. Acetaminophen scheduled, robaxin scheduled, oxycodone prn. Abdominal binder prn for  comfort.   - Home lexapro and cetirizine resumed.   - Incentive spirometry. Out of bed and ambulation.   - Regular diet. Protein supplement available.   - Miralax.   - Elevated LFTs expected after partial hepatectomy, improving.  - Lovenox held for PVC removal.   - PT/OT  - Discharge to home today.     Seen with Dr. King.     NBA Gaytan-C   Surgical Oncology   Vocera     PVC removed by anesthesia. OK to resume Lovenox at 1300. Discharge to home today.

## 2025-04-15 ENCOUNTER — MYC MEDICAL ADVICE (OUTPATIENT)
Dept: SURGERY | Facility: CLINIC | Age: 35
End: 2025-04-15
Payer: COMMERCIAL

## 2025-04-16 ENCOUNTER — OFFICE VISIT (OUTPATIENT)
Dept: URGENT CARE | Facility: URGENT CARE | Age: 35
End: 2025-04-16
Payer: COMMERCIAL

## 2025-04-16 ENCOUNTER — PATIENT OUTREACH (OUTPATIENT)
Dept: ONCOLOGY | Facility: CLINIC | Age: 35
End: 2025-04-16

## 2025-04-16 VITALS
RESPIRATION RATE: 17 BRPM | BODY MASS INDEX: 21.12 KG/M2 | WEIGHT: 169 LBS | SYSTOLIC BLOOD PRESSURE: 114 MMHG | DIASTOLIC BLOOD PRESSURE: 77 MMHG | HEART RATE: 101 BPM | TEMPERATURE: 98.9 F | OXYGEN SATURATION: 99 %

## 2025-04-16 DIAGNOSIS — I80.9 PHLEBITIS: Primary | ICD-10-CM

## 2025-04-16 LAB
PATH REPORT.ADDENDUM SPEC: NORMAL
PATH REPORT.COMMENTS IMP SPEC: NORMAL
PATH REPORT.FINAL DX SPEC: NORMAL
PATH REPORT.GROSS SPEC: NORMAL
PATH REPORT.MICROSCOPIC SPEC OTHER STN: NORMAL
PATH REPORT.RELEVANT HX SPEC: NORMAL
PHOTO IMAGE: NORMAL

## 2025-04-16 PROCEDURE — 3078F DIAST BP <80 MM HG: CPT | Performed by: NURSE PRACTITIONER

## 2025-04-16 PROCEDURE — 99213 OFFICE O/P EST LOW 20 MIN: CPT | Performed by: NURSE PRACTITIONER

## 2025-04-16 PROCEDURE — 3074F SYST BP LT 130 MM HG: CPT | Performed by: NURSE PRACTITIONER

## 2025-04-16 RX ORDER — CEFDINIR 300 MG/1
300 CAPSULE ORAL 2 TIMES DAILY
Qty: 14 CAPSULE | Refills: 0 | Status: SHIPPED | OUTPATIENT
Start: 2025-04-16 | End: 2025-04-23

## 2025-04-16 NOTE — PROGRESS NOTES
Chief Complaint   Patient presents with    Urgent Care    Bleeding/Bruising     Pt presents bruising on L wrist, red line on area that is increasingly going up arm, pt had abdominal surgery recently. Pt thinks there might have IV inserted in area of bruising.          ICD-10-CM    1. Phlebitis  I80.9       Suspect this is a simple phlebitis post IV so we will have him do warm wet compresses frequently over the next couple of days.  Gave him Rx for cefdinir to start if the erythema worsens or the redness continues to extend up the arm.  The differential would include infection and thrombophlebitis.  Follow-up if symptoms fail to improve.    Red flag warning signs and when to go to the emergency room discussed.  Reviewed potential adverse reactions to medications.    Subjective     Ivan Goyal is an 34 year old male who presents to clinic today for bruising and a red line over an area where he had an IV started while in the hospital.  On 4/9/2025 patient was admitted to the hospital and underwent a hepatic adenoma resection for a hepatic adenoma.  He was discharged home on 4/14/2025.  His recovery has been going well.  He has noticed bruising around various sites where he had an IV including the left wrist area but last night noted a red line developing over the vein he became concerned.  He denies any fever or chills.      Objective    /77   Pulse 101   Temp 98.9  F (37.2  C) (Tympanic)   Resp 17   Wt 76.7 kg (169 lb)   SpO2 99%   BMI 21.12 kg/m    Nurses notes and VS have been reviewed.    Physical Exam       GENERAL APPEARANCE: healthy appearing, alert     MS: extremities normal- no gross deformities noted; normal muscle tone.     SKIN: Multiple areas of ecchymosis on arms from previous IV sites, left anterior wrist has an area of ecchymosis measuring 2 inches x 5 inches with a reddish-purple streak that starts at the base of the wrist and goes up the forearm 5 inches, there is no drainage, it  is not hot to the touch and there is no tenderness     PSYCH: anxious      Bibiana SEDA San, CNP  [facility] Provider    The use of Dragon/[employer] dictation services may have been used to construct the content in this note; any grammatical or spelling errors are non-intentional. Please contact the author of this note directly if you are in need of any clarification.

## 2025-04-16 NOTE — PROGRESS NOTES
"[facility]: Surgical Oncology Post-op Call Note                                     Discussion with Patient                                                           Surgery:      1. Laparotomy  2. Intraoperative Liver Ultrasound  3. Partial Central Hepatectomy (Segment 4b/5) w/ En Bloc Cholecystectomy     Surgery date: 4/9/2025     Discharge Date:  4/14/2025    Date of Post-op Call:  4/16/2025       Immediate Concerns:     Bruising and redness at one of his prior IV sites. He went to [facility] and was diagnosed with phlebitis. He was provided with an antibiotic if it does improve over the next 1-2 days. He had a warm compress on the area at the time of the call.     Patric verbalized being fearful of \"overdoing it\" and developing a hernia, but is also fearful of blood clots. He is currently walking multiple times a day, [place] Reviewed that it is important to get up and move around every 1-2 hours, but that it does not have to be a long, strenuous walk each time. Discussed even just a quick trip to the bathroom or kitchen is enough to get blood circulating and help prevent blood clots. Encouraged a walk each day, but recommended he refrain from doing inclines until he can do them without significant pain.     Patric verbalized anxiety about \"doing everything right.\" Reassured him that recovery from surgery is not linear and that he will have good days and some not so good days and the expectation is that, as time goes on, he continues to feel better and more like himself. Emotional support provided.     Fever:   Denies fever. Does have chills intermittently through the day. He feels this may be related to nerve pain.       Pain:  No concerns with pain management. Taking scheduled Tylenol and PRN Robaxin. He has had minimal use of Oxycodone   Using pain medications as recommended with appropriate relief.      Incision:   No concerns, healing well, no redness, drainage or " edema reported.      Diet:   Regular diet  Denies nausea and vomiting. Feel bloated following eating. Encouraged smaller, more frequent meals and timing walks after eating to help alleviate bloating.      Bowels:   Passing gas. Having 1-2 bowel movements daily.   Denies feelings of constipation and cramping.      Activity:   No difficulty with ADLs reported.   Patient is up independently at home.   Encouraged patient to continue to stay active.        Post op/follow up plans:      Post op appointment scheduled,confirmed date and time with patient. Direct contact number provided for any additional questions or concerns.     Future Appointments   Date Time Provider Department Center   4/22/2025  8:15 AM Brian Lazo MD [facility]   7/18/2025  9:30 AM Joslyn Samuel MD [facility]         CHIARA Beltre  [facility]  Surgical Oncology       Approximately 15 min was spent in conversation with the patient.

## 2025-04-16 NOTE — PROGRESS NOTES
Urgent Care Clinic Visit    Chief Complaint   Patient presents with    Urgent Care    Bleeding/Bruising     Pt presents bruising on L wrist, red line on area that is increasingly going up arm, pt had abdominal surgery recently. Pt thinks there might have IV inserted in area of bruising.                4/16/2025    10:08 AM   Additional Questions   Roomed by Lindsay Pino   Accompanied by Paul(father)

## 2025-04-17 NOTE — TELEPHONE ENCOUNTER
Patric is calling back to return a call that he missed.  Informed Patric that NBA Castillo tried calling pt at 1330. Advised pt to keep phone handy and this writer will let Areli know he can speak with her if she calls back.    Routed to Care Team.

## 2025-04-17 NOTE — TELEPHONE ENCOUNTER
"Oncology Nurse Triage - Reporting Symptoms  Situation:   Ivan reporting the following symptoms:   1) racing heart (pulse >100)  2) low grade fever (~99 degrees consistently for 24 hours)  3) aching eyes, neck, back of throat  4) mild chills every ~10-20 minutes, more when reclined in bed  5) wheezing with deep breathing  6) fatigue, and some increasing loss of appetite    Background:   Treating Provider: Brian Lazo MD    DX: hepatic adenoma    Date of last office visit: 2/4/25 with Dr. Lazo    Recent treatments: Yes: 4/9/25: open partial central hepatectomy, cholecystectomy, intraoperative US.    Assessment  Onset of symptoms: In past 24 hours.  Pt went to  yesterday d/t red streak on arm. UC advised that he use hot compresses for a day and if it got worse to take cefdinir. He states that he did use the warm compresses and when he started to get a low grade fever (T-max 99.0), he started the antibiotics. Abx have been \"ravaging my gut.\" He has had diarrhea since starting the antibiotic. He has taken 2 doses. RX is for BID doses x 7 days.    We discussed the following sx:  1) racing heart (pulse >100), mostly just around there or above. Pt states that his HR was a lot slower in the hospital. He is increasing activity, but he reports less activity in the last 24 hours w/some fatigue and loss of appetite.  2) low grade fever (~99 degrees consistently for 24 hours)--he reports that the home thermometer shows 99 at three checks; this morning was 98.9 in a.m. at 0900 when he got up  3) aching eyes, neck, back of throat. He reports that his throat is sore, has post nasal drip. Can feel it at the back of his mouth and down his throat. It is not a full blown sore throat. He has some neck soreness, possibly from his posture, and some aching in the back of the head. Eyes feel deep ache.  4) mild chills every ~10-20 minutes, more when reclined in bed; he reports that this is more like shivering. It is pretty consistent, " constant. Feels like he can't keep his body temp stable and that he is either sweating or having chills. He notices it in the evening when getting into bed around 9:30 p.m.  5) wheezing with deep breathing; he is using the spirometer. He notices wheezing on inhalation when trying to do good deep breathing. Some coughing (has had 4-5 coughs in the last 24 hours). This is a decrease in coughing.  6) fatigue, and some increasing loss of appetite; he reports that he can do light walking w/o fatigue. He feels he is holding steady or possibly taking a few steps backwards. Decreased appetite is concerning for him. He has been hungry since day of the surgery, and now doesn't seem as hungry but is still trying to eat.    Yesterday felt like had UTI. Had frequency/urgency, pelvic floor PT. Took methocarbamol, urinary stuff less of an issue.    Pt voices that he is very anxious and worried that something will happen to impact his recovery.    Recommendations:   Informed pt that this information would be sent to his Care Team and they will advise or call him back.  Continue to push fluids.   Eat healthy meals, maybe 6 small meals per day instead of three larger meals per day.  As he increases activity postoperatively, he may be more fatigued. Advised he cluster activities and schedule in periods of rest.  Continue to use incentive spirometer. This will help keep his lungs clear so infection can be avoided.  Complete the antibiotics he started taking. Monitor temperature and if temp gets to be 100.4 or greater, he should give a call back to Triage.  Increase activity as tolerated, making sure to have periods of rest.   Call back if he has any concerns, questions, or worsening sx.    Pt voiced understanding.     Message routed to Care Team.

## 2025-04-18 LAB
PATH REPORT.ADDENDUM SPEC: NORMAL
PATH REPORT.ADDENDUM SPEC: NORMAL
PATH REPORT.COMMENTS IMP SPEC: NORMAL
PATH REPORT.FINAL DX SPEC: NORMAL
PATH REPORT.GROSS SPEC: NORMAL
PATH REPORT.MICROSCOPIC SPEC OTHER STN: NORMAL
PATH REPORT.RELEVANT HX SPEC: NORMAL
PHOTO IMAGE: NORMAL

## 2025-04-22 ENCOUNTER — LAB (OUTPATIENT)
Dept: LAB | Facility: CLINIC | Age: 35
End: 2025-04-22
Payer: COMMERCIAL

## 2025-04-22 ENCOUNTER — ONCOLOGY VISIT (OUTPATIENT)
Dept: SURGERY | Facility: CLINIC | Age: 35
End: 2025-04-22
Attending: SURGERY
Payer: COMMERCIAL

## 2025-04-22 ENCOUNTER — APPOINTMENT (OUTPATIENT)
Dept: LAB | Facility: CLINIC | Age: 35
End: 2025-04-22
Payer: COMMERCIAL

## 2025-04-22 VITALS
OXYGEN SATURATION: 100 % | RESPIRATION RATE: 16 BRPM | BODY MASS INDEX: 21.37 KG/M2 | HEART RATE: 74 BPM | WEIGHT: 171 LBS | SYSTOLIC BLOOD PRESSURE: 121 MMHG | TEMPERATURE: 97.3 F | DIASTOLIC BLOOD PRESSURE: 81 MMHG

## 2025-04-22 DIAGNOSIS — R19.5 LOOSE STOOLS: ICD-10-CM

## 2025-04-22 DIAGNOSIS — Z01.818 PRE-OPERATIVE EXAMINATION: Primary | ICD-10-CM

## 2025-04-22 DIAGNOSIS — D13.4 HEPATIC ADENOMA: Primary | ICD-10-CM

## 2025-04-22 DIAGNOSIS — D13.4 HEPATOCELLULAR ADENOMA: ICD-10-CM

## 2025-04-22 LAB — C DIFF TOX B STL QL: NEGATIVE

## 2025-04-22 PROCEDURE — 87493 C DIFF AMPLIFIED PROBE: CPT | Performed by: SURGERY

## 2025-04-22 PROCEDURE — 99213 OFFICE O/P EST LOW 20 MIN: CPT | Performed by: SURGERY

## 2025-04-22 PROCEDURE — 99000 SPECIMEN HANDLING OFFICE-LAB: CPT | Performed by: PATHOLOGY

## 2025-04-22 ASSESSMENT — PAIN SCALES - GENERAL: PAINLEVEL_OUTOF10: MILD PAIN (2)

## 2025-04-22 NOTE — LETTER
4/22/2025      Ivan Goyal  [address]  [place] [address]      Dear Colleague,    Thank you for referring your patient, Ivan Goyal, to the [facility]. Please see a copy of my visit note below.    Surgical Oncology - Post Op Patient  4/22/2025    34 M s/p open partial central hepatectomy w/ en bloc cholecystectomy for previously biopsy proven hepatic adenoma 4/9/2025.  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  Resection margins negative w/ 0.5 cm of clear parenchymal margin.  The resected gallbladder was normal.  The patient returns for post-op follow-up.    He is doing clinically well.  He is not using narcotics for pain.  He is still using his binder, just because it makes him feel secure.  He is having 6-7 loose stools a day since he started taking Augmentin for phlebitis.  He has one day of antibiotics left.  No nausea or vomiting.  No jaundice or pruritus.  He appears well on exam and has a benign abdomen with a well healing incision.  Non-tender and non-distended.    We discussed 6-8 week heavy activity restrictions.  We discussed eating a balanced diet.    We had a discussion about his final pathology and that he does not require further treatment.  With that being said, I would like to refer him back to hepatology in the event they want to continue him on a surveillance program.  Regarding his loose stools, we will send for C. Diff to be sure he doesn't have this given he has been on antibiotics.  Patient would also like a referral to OT and we will place this.  Otherwise, he is recovering as expected and does not require further follow-up with surgical oncology unless issues or questions arise.  Questions today answered and the patient was in agreement with and understanding of the plan.    A total of 20 minutes were spent on this encounter including face to face consultation, imaging  review, chart review, documentation, and coordination of care.      Again, thank you for allowing me to participate in the care of your patient.        Sincerely,        Brian Lazo MD    Electronically signed

## 2025-04-22 NOTE — NURSING NOTE
"Oncology Rooming Note    April 22, 2025 8:17 AM   Ivan Goyal is a 34 year old male who presents for:    Chief Complaint   Patient presents with    Oncology Clinic Visit     Hepatocellular Adenoma     Initial Vitals: /81 (BP Location: Right arm, Patient Position: Sitting, Cuff Size: Adult Regular)   Pulse 74   Temp 97.3  F (36.3  C) (Tympanic)   Resp 16   Wt 77.6 kg (171 lb)   SpO2 100%   BMI 21.37 kg/m   Estimated body mass index is 21.37 kg/m  as calculated from the following:    Height as of 4/9/25: 1.905 m (6' 3\").    Weight as of this encounter: 77.6 kg (171 lb). Body surface area is 2.03 meters squared.  Mild Pain (2) Comment: Data Unavailable   No LMP for male patient.  Allergies reviewed: Yes  Medications reviewed: Yes    Medications: Medication refills not needed today.  Pharmacy name entered into [employer]:    [facility] - [place], [place] - [address] [address] [place] [facility] - [place] - [address] [id number] [place] [place] [facility] [address] [facility]    Frailty Screening:   Is the patient here for a new oncology consult visit in cancer care? 2. No    PHQ9:  Did this patient require a PHQ9?: No      Clinical concerns: None       Aiyana Roque LPN  4/22/2025                "

## 2025-04-22 NOTE — PROGRESS NOTES
Surgical Oncology - Post Op Patient  4/22/2025    34 M s/p open partial central hepatectomy w/ en bloc cholecystectomy for previously biopsy proven hepatic adenoma 4/9/2025.  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  Resection margins negative w/ 0.5 cm of clear parenchymal margin.  The resected gallbladder was normal.  The patient returns for post-op follow-up.    He is doing clinically well.  He is not using narcotics for pain.  He is still using his binder, just because it makes him feel secure.  He is having 6-7 loose stools a day since he started taking Augmentin for phlebitis.  He has one day of antibiotics left.  No nausea or vomiting.  No jaundice or pruritus.  He appears well on exam and has a benign abdomen with a well healing incision.  Non-tender and non-distended.    We discussed 6-8 week heavy activity restrictions.  We discussed eating a balanced diet.    We had a discussion about his final pathology and that he does not require further treatment.  With that being said, I would like to refer him back to hepatology in the event they want to continue him on a surveillance program.  Regarding his loose stools, we will send for C. Diff to be sure he doesn't have this given he has been on antibiotics.  Patient would also like a referral to OT and we will place this.  Otherwise, he is recovering as expected and does not require further follow-up with surgical oncology unless issues or questions arise.  Questions today answered and the patient was in agreement with and understanding of the plan.    A total of 20 minutes were spent on this encounter including face to face consultation, imaging review, chart review, documentation, and coordination of care.

## 2025-05-01 LAB
BASE EXCESS BLDA CALC-SCNC: 1.8 MMOL/L (ref -3–3)
CA-I BLD-MCNC: 4.6 MG/DL (ref 4.4–5.2)
CHLORIDE BLD-SCNC: 104 MMOL/L
COHGB MFR BLD: 2.1 % (ref 0–2)
GLUCOSE BLD-MCNC: 113 MG/DL (ref 70–99)
HCO3 BLDA-SCNC: 26 MMOL/L (ref 21–28)
HGB BLD-MCNC: 14.2 G/DL (ref 13.3–17.7)
HGB BLD-MCNC: 14.2 G/DL (ref 13.3–17.7)
LACTATE BLD-SCNC: 1.3 MMOL/L (ref 0.7–2)
METHGB MFR BLD: <0 % (ref 0–3)
O2/TOTAL GAS SETTING VFR VENT: 32 %
OXYHGB MFR BLDA: 97 % (ref 92–100)
OXYHGB MFR BLDA: 98 % (ref 92–100)
PCO2 BLDA: 38 MM HG (ref 35–45)
PH BLDA: 7.44 [PH] (ref 7.35–7.45)
PO2 BLDA: 153 MM HG (ref 80–105)
POTASSIUM BLD-SCNC: 4.1 MMOL/L (ref 3.4–5.3)
SAO2 % BLDA: 99 % (ref 96–97)
SODIUM BLD-SCNC: 139 MMOL/L (ref 135–145)

## 2025-05-05 ENCOUNTER — PATIENT OUTREACH (OUTPATIENT)
Dept: CARE COORDINATION | Facility: CLINIC | Age: 35
End: 2025-05-05
Payer: COMMERCIAL

## 2025-05-21 ENCOUNTER — TELEPHONE (OUTPATIENT)
Dept: SURGERY | Facility: CLINIC | Age: 35
End: 2025-05-21
Payer: COMMERCIAL

## 2025-05-21 NOTE — CONFIDENTIAL NOTE
TELEPHONE CALL  May 21, 2025    Ivan Goyal is a 34 year old male s/p open partial hepatectomy of segment 4b/5 for hepatic adenoma.     He called with the following questions that were addressed.     Ok to drive.     Ok to resume activities at tolerated.     Ok to sleep on abdomen.     Areli Bronson PA-C

## 2025-05-26 ASSESSMENT — ACTIVITIES OF DAILY LIVING (ADL)
WASHING_YOUR_HAIR?: 0
TOUCHING_THE_BACK_OF_YOUR_NECK: 0
WASHING_YOUR_BACK: 0
CARRYING_A_HEAVY_OBJECT_OF_10_POUNDS: 1
PLEASE_INDICATE_YOR_PRIMARY_REASON_FOR_REFERRAL_TO_THERAPY:: SHOULDER
PUSHING_WITH_THE_INVOLVED_ARM: 6
PUTTING_ON_YOUR_PANTS: 0
AT_ITS_WORST?: 7
REACHING_FOR_SOMETHING_ON_A_HIGH_SHELF: 0
PLACING_AN_OBJECT_ON_A_HIGH_SHELF: 0
PUTTING_ON_AN_UNDERSHIRT_OR_A_PULLOVER_SWEATER: 0
PUTTING_ON_A_SHIRT_THAT_BUTTONS_DOWN_THE_FRONT: 0
WHEN_LYING_ON_THE_INVOLVED_SIDE: 4
REMOVING_SOMETHING_FROM_YOUR_BACK_POCKET: 0

## 2025-05-27 PROBLEM — M25.511 CHRONIC RIGHT SHOULDER PAIN: Status: RESOLVED | Noted: 2024-12-24 | Resolved: 2025-05-27

## 2025-05-27 PROBLEM — G89.29 CHRONIC RIGHT SHOULDER PAIN: Status: RESOLVED | Noted: 2024-12-24 | Resolved: 2025-05-27

## 2025-05-27 NOTE — PROGRESS NOTES
PHYSICAL THERAPY EVALUATION  Type of Visit: Evaluation       Fall Risk Screen:  Have you fallen 2 or more times in the past year?: No  Have you fallen and had an injury in the past year?: No    Subjective         Presenting condition or subjective complaint: Post-abdominal surgery rehabilitation and strength training  Date of onset: 04/09/25    Relevant medical history: Bladder or bowel problems; Depression; High blood pressure; Pain at night or rest   Dates & types of surgery: Open liver resection & gallbladder removal, 4/9/2025    Prior diagnostic imaging/testing results: MRI; CT scan; X-ray     Prior therapy history for the same diagnosis, illness or injury: No      Living Environment  Social support: Alone   Type of home: Apartment/condo   Stairs to enter the home: Yes 20 Is there a railing: Yes     Ramp: No   Stairs inside the home: No       Help at home: None  Equipment owned: Bath bench     Employment: Yes Inpatient Psychologist  Hobbies/Interests: Outdoor exploration (kayaking, [place] skiing, biking, running/hiking), guitar, drums,    Patient goals for therapy: Begin to strengthen abdominal muscles and return to weight lifting    Ivan Lanier Jyotsna is a 34 year old male with a abdominal condition. Mechanism of injury: Patient presenting to outpatient PT for post-abdominal surgery rehabilitation and strength training. Patient had surgery on 4/9/25 to remove hepatic adenoma. 1. Laparotomy, 2. Intraoperative Liver Ultrasound, 3. Partial Central Hepatectomy (Segment 4b/5) w/ En Bloc Cholecystectomy. Where: (home, work, MVA, community, recreation/sport, unknown, other): NA. Location of symptoms: right abdominal region, incision. Pain level on number scale: 1-3/10. Quality of pain: achy, soreness. Associated symptoms: needles. Pain frequency (constant/intermittent): constant. Symptoms are exacerbated by: stretching, sit<>stand, moving in bed, sleeping, lying on sides, dressing, washing, bending, sitting.  Symptoms are relieved by: tylenol. Progression of symptoms since onset (same/better/worse): better. Special tests (x-ray, MRI, CT scan, EMG, bone scan): MRI, x-ray, CT. Previous treatment: None. Improvement with previous treatment: NA. Pertinent medical history includes:  see Epic. Medical allergies includes: see Epic. Surgical history includes: see Epic. Current medications include: see Epic. Occupation: Inpatient Psychologist. Patient is (working in normal job without restrictions/working in normal job with restrictions/working in an alternate job/not working due to present treatment problem): working in normal job with restrictions. Primary job tasks: standing, sitting, computer work. Barriers at home/work: None reported by patient. Red flags: None reported by patient.     Objective   Posture    Sitting (good/fair/poor): fair  Standing (good/fair/poor):  fair  Lordosis (red/acc/normal):  normal  Lateral shift (right/left/nil):  nil  Relevant lateral shift (yes/no):  no  Correction of posture (better/worse/no effect): better    Neurological    Myotomes L R   L1-2 (hip flexion) 5/5 5/5   L3 (knee extension) 5/5 5/5   L4 (ankle DF) 5/5 5/5   L5 (g. toe ext) 5/5 5/5   S1 (ankle PF or knee flex) 5/5 5/5     Sensory Deficit, Reflexes: lumbar dermatomes WNL    Lumbar Movement Loss James Mod Min Nil Pain   Flexion   x  +thoracic spine   Extension  x   +right abdominal region   Side Gliding L        Side Gliding R          Weak proximal hip musculature and abdominal/core strength    Assessment & Plan   CLINICAL IMPRESSIONS  Medical Diagnosis:      Treatment Diagnosis: Generalized muscle weakness, Aftercare following surgery of the musculoskeletal system,   Impression/Assessment: Patient is a 34 year old male with abdominal complaints.  The following significant findings have been identified: Pain, Decreased ROM/flexibility, Decreased joint mobility, Decreased strength, Impaired balance, Decreased proprioception, Impaired  sensation, Impaired muscle performance, Decreased activity tolerance, and Impaired posture. These impairments interfere with their ability to perform self care tasks, work tasks, recreational activities, household chores, household mobility, and community mobility as compared to previous level of function.     Clinical Decision Making (Complexity):  Clinical Presentation: Stable/Uncomplicated  Clinical Presentation Rationale: based on medical and personal factors listed in PT evaluation  Clinical Decision Making (Complexity): Low complexity    PLAN OF CARE  Treatment Interventions:  Interventions: Manual Therapy, Neuromuscular Re-education, Therapeutic Activity, Therapeutic Exercise, Self-Care/Home Management    Long Term Goals     PT Goal 1  Goal Description: Patient will be able to transfer sit<>stand with 0/10 pain.  Rationale: to maximize safety and independence with performance of ADLs and functional tasks  Target Date: 08/20/25      Frequency of Treatment: 1x/week  Duration of Treatment: 12 weeks    Recommended Referrals to Other Professionals: None  Education Assessment:   Learner/Method: Patient;No Barriers to Learning    Risks and benefits of evaluation/treatment have been explained.   Patient/Family/caregiver agrees with Plan of Care.     Evaluation Time:     PT Eval, Low Complexity Minutes (79648): 15  Signing Clinician: Lakhwinder Monroy PT

## 2025-05-28 ENCOUNTER — THERAPY VISIT (OUTPATIENT)
Dept: PHYSICAL THERAPY | Facility: CLINIC | Age: 35
End: 2025-05-28
Payer: COMMERCIAL

## 2025-05-28 DIAGNOSIS — Z47.89 AFTERCARE FOLLOWING SURGERY OF THE MUSCULOSKELETAL SYSTEM: Primary | ICD-10-CM

## 2025-05-28 DIAGNOSIS — M62.81 GENERALIZED MUSCLE WEAKNESS: ICD-10-CM

## 2025-05-28 PROCEDURE — 97110 THERAPEUTIC EXERCISES: CPT | Mod: GP | Performed by: PHYSICAL THERAPIST

## 2025-05-28 PROCEDURE — 97530 THERAPEUTIC ACTIVITIES: CPT | Mod: GP | Performed by: PHYSICAL THERAPIST

## 2025-05-28 PROCEDURE — 97161 PT EVAL LOW COMPLEX 20 MIN: CPT | Mod: GP | Performed by: PHYSICAL THERAPIST

## 2025-06-02 ENCOUNTER — LAB (OUTPATIENT)
Dept: LAB | Facility: CLINIC | Age: 35
End: 2025-06-02
Payer: COMMERCIAL

## 2025-06-02 ENCOUNTER — OFFICE VISIT (OUTPATIENT)
Dept: GASTROENTEROLOGY | Facility: CLINIC | Age: 35
End: 2025-06-02
Attending: PHYSICIAN ASSISTANT
Payer: COMMERCIAL

## 2025-06-02 VITALS
WEIGHT: 171.3 LBS | BODY MASS INDEX: 21.3 KG/M2 | OXYGEN SATURATION: 98 % | HEIGHT: 75 IN | DIASTOLIC BLOOD PRESSURE: 71 MMHG | HEART RATE: 78 BPM | SYSTOLIC BLOOD PRESSURE: 108 MMHG

## 2025-06-02 DIAGNOSIS — R19.7 DIARRHEA, UNSPECIFIED TYPE: ICD-10-CM

## 2025-06-02 RX ORDER — CHOLESTYRAMINE 4 G/9G
1 POWDER, FOR SUSPENSION ORAL 2 TIMES DAILY WITH MEALS
Qty: 60 PACKET | Refills: 3 | Status: SHIPPED | OUTPATIENT
Start: 2025-06-02

## 2025-06-02 ASSESSMENT — PAIN SCALES - GENERAL: PAINLEVEL_OUTOF10: MILD PAIN (1)

## 2025-06-02 NOTE — LETTER
6/2/2025      Ivan Goyal  [address]  [facility] [address]      Dear Colleague,    Thank you for referring your patient, Ivan Goyal, to the [facility] [place]. Please see a copy of my visit note below.    GI CLINIC VISIT - NEW PATIENT    CC/REFERRING MD:  Areli Bronson  REASON FOR CONSULTATION: diarrhea    ASSESSMENT/PLAN:      # Chronic loose stools  # Diarrhea, postcholecystectomy  Patient with loose stools presenting as he can remember with worsening around 2017 when starting grad school.  No blood, melena, nocturnal stools.  No specific dietary triggers.  Do seem to worsen with stress.  Notes that he may have seen worms in segments previously.  He does have underlying pelvic floor dysfunction/tension, working with PT regarding this.  He recently underwent partial hepatectomy with cholecystectomy for hepatic adenoma, following this had course of antibiotics for phlebitis.  Since has had worsening diarrhea with 6-7 liquid stools per day.  C. difficile negative following worsening.  He has some improvement with cholestyramine once nightly, now about 3-4 stools per day with a little less urgency.  Suspect that worsening of diarrhea following cholecystectomy related to bile acid diarrhea.  Will try increasing cholestyramine to twice daily.  Suspect that longstanding loose stools with other , differential broad but includes IBS, celiac, IBD, chronic infection, thyroid dysfunction, dietary, lactose intolerance or other carbohydrate malabsorption, SIBO, pancreatic insufficiency, other.  We will start with labs and stool test including fecal calprotectin, enteric panel and ova and parasite.  Blood work for celiac serologies, thyroid, CRP.  Future considerations include colonoscopy, hydrogen breath testing, fecal fat/fecal elastase, trial of low FODMAP diet.        Discussed differential, outlined options and reviewed medications with patient.   Mutually agreed upon plan outlined below.  PLAN:  -- Increase cholestyramine to twice daily  -- Fecal calprotectin  -- Infectious stool studies including Enteric Panel,  Ova & Parasite. C.diff previously negative.  -- Celiac serologies  -- TSH with T4 reflex  -- CRP/ESR  -- Consider colonoscopy if testing normal/no significant response to cholestyramine     Colorectal cancer screening: No personal or family history of colon cancer or advanced colon polyps, current guidelines recommend starting screening at age 45. Pursue sooner if symptoms change.        RTC 3 months    Thank you for this consultation.  It was a pleasure to participate in the care of this patient; please contact us with any further questions.     70Minutes was spent on the date of the encounter during chart review, history and exam, documentation, and further activities as noted       Lizbeth Young PA-C  Division of Hepatology, Gastroenterology & Nutrition  [facility]      HPI  Mr. Goyal is a 34 year old year old male with history of partial central hepatectomy w/ en bloc cholecystectomy for biopsy proven hepatic adenoma (4/9/2025), post op abdominal pain undergoing PT, dysuria, pelvic pain, pelvic floor tension, anxiety  who presents for evaluation of diarrhea. They are new to the [facility] GI clinic and this is my first encounter with the patient.     He reports longstanding loose stools for as long as he can remember, with more recent onset of more frequent urgent loose-liquid stools following partial hepatectomy and cholecystectomy and antibiotics for phlebitis.    He reports at baseline typically has 2 loose stool per day without significant urgency.  Recounts that 15 years ago he had more diarrhea, in 2017 completed grad school and continued to get worse with [place] type VI-VII stools, completed fellowship in postdoc in did not get better.  Initially thought related to stress.  No clear dietary triggers that he has been able to  find.      Recently prior to recent surgery would have about 2 loose to soft ([place] type IV-VI) stools per day with occasional mild urgency.  No hard stools.  No days without stool.  No straining, typically feels empty.  No nocturnal stools.  No blood or melena.  Notes that he is seen rice pieces or possibly worms in his stool multiple times before.   Prior to surgery was working with pelvic floor PT, did biofeedback for pelvic tension.    Following surgery had 6-7 liquid, urgent stools per day.  Unsure if this started immediately after surgery versus with Augmentin course for phlebitis.  C. difficile checked and was negative.  He recently started cholestyramine, 4 mg scoop once daily which has slowed stools down.  Now having 3-4 liquid stools per day.  He also recently tried Metamucil, 1 tablespoon 2-3 times per day, this resulted in more gelatinous, slightly more formed.  Not currently taking.    Does note that stools seem to be more loose/frequent with more stress.  Currently limiting sugar, dairy, fats to try to help with stooling.    Father -possible prior chron's diagnosis, but more recently thought to be something else.  Grandfather with unknown GI concerns.     GI review of systems otherwise largely negative.  Has had more gas since surgery which can be painful, better after a stool.  No other significant abdominal pain.  No nausea or vomiting.  Has history of GERD as a teen which is now better.    ROS:  Complete 10 System ROS performed. All are negative except as documented below, in the HPI, or in patient questionnaire from today's visit.    PROBLEM LIST  Patient Active Problem List    Diagnosis Date Noted     Aftercare following surgery of the musculoskeletal system 05/28/2025     Priority: Medium     Generalized muscle weakness 05/28/2025     Priority: Medium     Dysuria 12/30/2024     Priority: Medium     Pelvic pain in male 12/30/2024     Priority: Medium     Hepatocellular adenoma 12/10/2024      Priority: Medium     High serum lipoprotein(a) 12/05/2024     Priority: Medium     Penile pain 11/26/2024     Priority: Medium     For the past few years have been struggling with aching pain in the penis after sexual arousal. This impacts sexual function, but also can lead to feelings of essentially a UTI after ejaculation. I have seen several urologists who originally diagnosed chronic prostatitis, but I am wondering if it may be more related to pelvic floor tightness and nerve pain. I can explain in more detail in person, as well.       Anxiety 11/26/2024     Priority: Medium     Been coping with anxiety for years - considering potentially discussing trialing an antidepressant.         PERTINENT PAST MEDICAL HISTORY:  Past Medical History:   Diagnosis Date     Anxiety      Hepatocellular adenoma      HTN (hypertension)      Hyperlipidemia      Infection due to 2019 novel coronavirus      Neck pain      Pelvic floor tension        PREVIOUS SURGERIES:  Past Surgical History:   Procedure Laterality Date     CHOLECYSTECTOMY N/A 4/9/2025    Procedure: CHOLECYSTECTOMY, INTRAOPERATIVE ULTRASOUND;  Surgeon: Brian Lazo MD;  Location: [facility] OR     CIRCUMCISION       GUM SURGERY       HEPATECTOMY PARTIAL N/A 4/9/2025    Procedure: OPEN PARTIAL CENTRAL HEPATECTOMY;  Surgeon: Brian Lazo MD;  Location: [facility] OR     VASECTOMY         ALLERGIES:     Allergies   Allergen Reactions     Amoxicillin Hives     amoxicillin - [facility] Allergy Remediation     Seasonal Allergies Cough, Difficulty breathing and Headache       PERTINENT MEDICATIONS:    Current Outpatient Medications:      acetaminophen (TYLENOL) 325 MG tablet, Take 2-3 tablets (650-975 mg) by mouth every 8 hours as needed for mild pain., Disp: 100 tablet, Rfl: 0     amLODIPine (NORVASC) 5 MG tablet, Take 1 tablet (5 mg) by mouth daily., Disp: 90 tablet, Rfl: 3     cetirizine (ZYRTEC) 10 MG tablet, Take 10 mg by mouth daily as needed for allergies., Disp: , Rfl:       cholecalciferol (VITAMIN D3) 25 mcg (1000 units) capsule, Take 1 capsule by mouth every morning., Disp: , Rfl:      cholestyramine (QUESTRAN) 4 GM/DOSE powder, Take 4 g by mouth daily., Disp: 120 g, Rfl: 0     escitalopram (LEXAPRO) 5 MG tablet, Take 2 tablets (10 mg) by mouth daily., Disp: 180 tablet, Rfl: 2     fish oil-omega-3 fatty acids 500 MG capsule, Take 500 mg by mouth every morning., Disp: , Rfl:      methocarbamol (ROBAXIN) 500 MG tablet, Take 1.5 tablets (750 mg) by mouth every 6 hours as needed for muscle spasms., Disp: 20 tablet, Rfl: 0     ondansetron (ZOFRAN) 4 MG tablet, Take 1 tablet (4 mg) by mouth every 6 hours as needed for nausea., Disp: 10 tablet, Rfl: 0     oxyCODONE (ROXICODONE) 5 MG tablet, Take 1 tablet (5 mg) by mouth every 6 hours as needed for moderate pain., Disp: 20 tablet, Rfl: 0     polyethylene glycol (MIRALAX) 17 GM/Dose powder, Take 17 g by mouth daily as needed for constipation., Disp: 510 g, Rfl: 0   No other NSAID/anticoagulation reported by patient.   No other OTC/herbal/supplements reported by patient.    SOCIAL HISTORY:  Social History     Socioeconomic History     Marital status: Single     Spouse name: Not on file     Number of children: Not on file     Years of education: Not on file     Highest education level: Not on file   Occupational History     Not on file   Tobacco Use     Smoking status: Never     Passive exposure: Never     Smokeless tobacco: Never   Vaping Use     Vaping status: Never Used   Substance and Sexual Activity     Alcohol use: Not Currently     Comment: 1 drink month     Drug use: Never     Sexual activity: Not on file   Other Topics Concern     Not on file   Social History Narrative     Not on file     Social Drivers of Health     Financial Resource Strain: Low Risk  (4/9/2025)    Financial Resource Strain      Within the past 12 months, have you or your family members you live with been unable to get utilities (heat, electricity) when it was really  needed?: No   Food Insecurity: Low Risk  (4/9/2025)    Food Insecurity      Within the past 12 months, did you worry that your food would run out before you got money to buy more?: No      Within the past 12 months, did the food you bought just not last and you didn t have money to get more?: No   Transportation Needs: Low Risk  (4/9/2025)    Transportation Needs      Within the past 12 months, has lack of transportation kept you from medical appointments, getting your medicines, non-medical meetings or appointments, work, or from getting things that you need?: No   Physical Activity: Not on file   Stress: Not on file   Social Connections: Not on file   Interpersonal Safety: Low Risk  (4/9/2025)    Interpersonal Safety      Do you feel physically and emotionally safe where you currently live?: Yes      Within the past 12 months, have you been hit, slapped, kicked or otherwise physically hurt by someone?: No      Within the past 12 months, have you been humiliated or emotionally abused in other ways by your partner or ex-partner?: No   Housing Stability: Low Risk  (4/9/2025)    Housing Stability      Do you have housing? 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 No other Vargas or other HPS-related Tiffany. No IBD or celiac disease. No other Autoimmune, Liver, or Thyroid disease.  Family History   Problem Relation Age of Onset     Obesity Mother      Other Problems  (HLD) Mother      Depression Father      Obesity Father      Hypertension Father      Other Problems  (HLD) Father      Breast Cancer Paternal Aunt      Melanoma Paternal Uncle      Anesthesia Reaction No family hx of      Bleeding Disorder No family hx of      Clotting Disorder No family hx of        Past/family/social history reviewed and no changes    PHYSICAL  EXAMINATION:  Constitutional: AAOx3, cooperative, pleasant, not dyspneic/diaphoretic, no acute distress  Vitals reviewed: There were no vitals taken for this visit.  Wt:   Wt Readings from Last 2 Encounters:   04/22/25 77.6 kg (171 lb)   04/16/25 76.7 kg (169 lb)      Eyes: Sclera anicteric/injected  Ears/nose/mouth/throat: Normal oropharynx without ulcers or exudate, mucus membranes moist, hearing intact  Neck: supple, thyroid normal size  CV: No edema  Respiratory: Unlabored breathing  Lymph: No axillary, submandibular, supraclavicular or inguinal lymphadenopathy  Abd:  Nondistended, +bs, no hepatosplenomegaly, nontender, no peritoneal signs  Skin: warm, perfused, no jaundice  Psych: Normal affect  MSK: Normal gait    PREVIOUS ENDOSCOPY:  No prior    PERTINENT STUDIES:  Labs  C.diff negative 4/22/25    Imaging  CT Abdomen and Pelvis 3/26/25:                                                                 IMPRESSION:   1. Grossly stable heterogeneously enhancing hepatic mass segment 5  compatible with biopsy-proven adenoma. No new hepatic lesion.  2. Hepatic vasculature is patent without anomalous variation.  3. Mild splenomegaly with dilated splenic vein. No overt imaging  features of cirrhosis.           Again, thank you for allowing me to participate in the care of your patient.        Sincerely,        Lizbeth Young PA-C    Electronically signed

## 2025-06-02 NOTE — PROGRESS NOTES
GI CLINIC VISIT - NEW PATIENT    CC/REFERRING MD:  Areli Bronson  REASON FOR CONSULTATION: diarrhea    ASSESSMENT/PLAN:      # Chronic loose stools  # Diarrhea, postcholecystectomy  Patient with loose stools presenting as he can remember with worsening around 2017 when starting grad school.  No blood, melena, nocturnal stools.  No specific dietary triggers.  Do seem to worsen with stress.  Notes that he may have seen worms in segments previously.  He does have underlying pelvic floor dysfunction/tension, working with PT regarding this.  He recently underwent partial hepatectomy with cholecystectomy for hepatic adenoma, following this had course of antibiotics for phlebitis.  Since has had worsening diarrhea with 6-7 liquid stools per day.  C. difficile negative following worsening.  He has some improvement with cholestyramine once nightly, now about 3-4 stools per day with a little less urgency.  Suspect that worsening of diarrhea following cholecystectomy related to bile acid diarrhea.  Will try increasing cholestyramine to twice daily.  Suspect that longstanding loose stools with other , differential broad but includes IBS, celiac, IBD, chronic infection, thyroid dysfunction, dietary, lactose intolerance or other carbohydrate malabsorption, SIBO, pancreatic insufficiency, other.  We will start with labs and stool test including fecal calprotectin, enteric panel and ova and parasite.  Blood work for celiac serologies, thyroid, CRP.  Future considerations include colonoscopy, hydrogen breath testing, fecal fat/fecal elastase, trial of low FODMAP diet.        Discussed differential, outlined options and reviewed medications with patient.  Mutually agreed upon plan outlined below.  PLAN:  -- Increase cholestyramine to twice daily  -- Fecal calprotectin  -- Infectious stool studies including Enteric Panel,  Ova & Parasite. C.diff previously negative.  -- Celiac serologies  -- TSH with T4 reflex  -- CRP/ESR  --  Consider colonoscopy if testing normal/no significant response to cholestyramine     Colorectal cancer screening: No personal or family history of colon cancer or advanced colon polyps, current guidelines recommend starting screening at age 45. Pursue sooner if symptoms change.        RTC 3 months    Thank you for this consultation.  It was a pleasure to participate in the care of this patient; please contact us with any further questions.     70Minutes was spent on the date of the encounter during chart review, history and exam, documentation, and further activities as noted       Lizbeth Young PA-C  Division of Hepatology, Gastroenterology & Nutrition  [facility]      HPI  Mr. Goyal is a 34 year old year old male with history of partial central hepatectomy w/ en bloc cholecystectomy for biopsy proven hepatic adenoma (4/9/2025), post op abdominal pain undergoing PT, dysuria, pelvic pain, pelvic floor tension, anxiety  who presents for evaluation of diarrhea. They are new to the [facility] clinic and this is my first encounter with the patient.     He reports longstanding loose stools for as long as he can remember, with more recent onset of more frequent urgent loose-liquid stools following partial hepatectomy and cholecystectomy and antibiotics for phlebitis.    He reports at baseline typically has 2 loose stool per day without significant urgency.  Recounts that 15 years ago he had more diarrhea, in 2017 completed grad school and continued to get worse with Soldier type VI-VII stools, completed fellowship in postdoc in did not get better.  Initially thought related to stress.  No clear dietary triggers that he has been able to find.      Recently prior to recent surgery would have about 2 loose to soft (Soldier type IV-VI) stools per day with occasional mild urgency.  No hard stools.  No days without stool.  No straining, typically feels empty.  No nocturnal stools.  No blood or melena.  Notes that he  is seen rice pieces or possibly worms in his stool multiple times before.   Prior to surgery was working with pelvic floor PT, did biofeedback for pelvic tension.    Following surgery had 6-7 liquid, urgent stools per day.  Unsure if this started immediately after surgery versus with Augmentin course for phlebitis.  C. difficile checked and was negative.  He recently started cholestyramine, 4 mg scoop once daily which has slowed stools down.  Now having 3-4 liquid stools per day.  He also recently tried Metamucil, 1 tablespoon 2-3 times per day, this resulted in more gelatinous, slightly more formed.  Not currently taking.    Does note that stools seem to be more loose/frequent with more stress.  Currently limiting sugar, dairy, fats to try to help with stooling.    Father -possible prior chron's diagnosis, but more recently thought to be something else.  Grandfather with unknown GI concerns.     GI review of systems otherwise largely negative.  Has had more gas since surgery which can be painful, better after a stool.  No other significant abdominal pain.  No nausea or vomiting.  Has history of GERD as a teen which is now better.    ROS:  Complete 10 System ROS performed. All are negative except as documented below, in the HPI, or in patient questionnaire from today's visit.    PROBLEM LIST  Patient Active Problem List    Diagnosis Date Noted    Aftercare following surgery of the musculoskeletal system 05/28/2025     Priority: Medium    Generalized muscle weakness 05/28/2025     Priority: Medium    Dysuria 12/30/2024     Priority: Medium    Pelvic pain in male 12/30/2024     Priority: Medium    Hepatocellular adenoma 12/10/2024     Priority: Medium    High serum lipoprotein(a) 12/05/2024     Priority: Medium    Penile pain 11/26/2024     Priority: Medium     For the past few years have been struggling with aching pain in the penis after sexual arousal. This impacts sexual function, but also can lead to feelings of  essentially a UTI after ejaculation. I have seen several urologists who originally diagnosed chronic prostatitis, but I am wondering if it may be more related to pelvic floor tightness and nerve pain. I can explain in more detail in person, as well.      Anxiety 11/26/2024     Priority: Medium     Been coping with anxiety for years - considering potentially discussing trialing an antidepressant.         PERTINENT PAST MEDICAL HISTORY:  Past Medical History:   Diagnosis Date    Anxiety     Hepatocellular adenoma     HTN (hypertension)     Hyperlipidemia     Infection due to 2019 novel coronavirus     Neck pain     Pelvic floor tension        PREVIOUS SURGERIES:  Past Surgical History:   Procedure Laterality Date    CHOLECYSTECTOMY N/A 4/9/2025    Procedure: CHOLECYSTECTOMY, INTRAOPERATIVE ULTRASOUND;  Surgeon: Brian Lazo MD;  Location: [facility] OR    CIRCUMCISION      GUM SURGERY      HEPATECTOMY PARTIAL N/A 4/9/2025    Procedure: OPEN PARTIAL CENTRAL HEPATECTOMY;  Surgeon: Brian Lazo MD;  Location: [facility] OR    VASECTOMY         ALLERGIES:     Allergies   Allergen Reactions    Amoxicillin Hives     amoxicillin - [facility] Allergy Remediation    Seasonal Allergies Cough, Difficulty breathing and Headache       PERTINENT MEDICATIONS:    Current Outpatient Medications:     acetaminophen (TYLENOL) 325 MG tablet, Take 2-3 tablets (650-975 mg) by mouth every 8 hours as needed for mild pain., Disp: 100 tablet, Rfl: 0    amLODIPine (NORVASC) 5 MG tablet, Take 1 tablet (5 mg) by mouth daily., Disp: 90 tablet, Rfl: 3    cetirizine (ZYRTEC) 10 MG tablet, Take 10 mg by mouth daily as needed for allergies., Disp: , Rfl:     cholecalciferol (VITAMIN D3) 25 mcg (1000 units) capsule, Take 1 capsule by mouth every morning., Disp: , Rfl:     cholestyramine (QUESTRAN) 4 GM/DOSE powder, Take 4 g by mouth daily., Disp: 120 g, Rfl: 0    escitalopram (LEXAPRO) 5 MG tablet, Take 2 tablets (10 mg) by mouth daily., Disp: 180 tablet, Rfl: 2    fish  oil-omega-3 fatty acids 500 MG capsule, Take 500 mg by mouth every morning., Disp: , Rfl:     methocarbamol (ROBAXIN) 500 MG tablet, Take 1.5 tablets (750 mg) by mouth every 6 hours as needed for muscle spasms., Disp: 20 tablet, Rfl: 0    ondansetron (ZOFRAN) 4 MG tablet, Take 1 tablet (4 mg) by mouth every 6 hours as needed for nausea., Disp: 10 tablet, Rfl: 0    oxyCODONE (ROXICODONE) 5 MG tablet, Take 1 tablet (5 mg) by mouth every 6 hours as needed for moderate pain., Disp: 20 tablet, Rfl: 0    polyethylene glycol (MIRALAX) 17 GM/Dose powder, Take 17 g by mouth daily as needed for constipation., Disp: 510 g, Rfl: 0   No other NSAID/anticoagulation reported by patient.   No other OTC/herbal/supplements reported by patient.    SOCIAL HISTORY:  Social History     Socioeconomic History    Marital status: Single     Spouse name: Not on file    Number of children: Not on file    Years of education: Not on file    Highest education level: Not on file   Occupational History    Not on file   Tobacco Use    Smoking status: Never     Passive exposure: Never    Smokeless tobacco: Never   Vaping Use    Vaping status: Never Used   Substance and Sexual Activity    Alcohol use: Not Currently     Comment: 1 drink month    Drug use: Never    Sexual activity: Not on file   Other Topics Concern    Not on file   Social History Narrative    Not on file     Social Drivers of Health     Financial Resource Strain: Low Risk  (4/9/2025)    Financial Resource Strain     Within the past 12 months, have you or your family members you live with been unable to get utilities (heat, electricity) when it was really needed?: No   Food Insecurity: Low Risk  (4/9/2025)    Food Insecurity     Within the past 12 months, did you worry that your food would run out before you got money to buy more?: No     Within the past 12 months, did the food you bought just not last and you didn t have money to get more?: No   Transportation Needs: Low Risk   (4/9/2025)    Transportation Needs     Within the past 12 months, has lack of transportation kept you from medical appointments, getting your medicines, non-medical meetings or appointments, work, or from getting things that you need?: No   Physical Activity: Not on file   Stress: Not on file   Social Connections: Not on file   Interpersonal Safety: Low Risk  (4/9/2025)    Interpersonal Safety     Do you feel physically and emotionally safe where you currently live?: Yes     Within the past 12 months, have you been hit, slapped, kicked or otherwise physically hurt by someone?: No     Within the past 12 months, have you been humiliated or emotionally abused in other ways by your partner or ex-partner?: No   Housing Stability: Low Risk  (4/9/2025)    Housing Stability     Do you have housing? 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 No other Vargas or other HPS-related Tiffany. No IBD or celiac disease. No other Autoimmune, Liver, or Thyroid disease.  Family History   Problem Relation Age of Onset    Obesity Mother     Other Problems  (HLD) Mother     Depression Father     Obesity Father     Hypertension Father     Other Problems  (HLD) Father     Breast Cancer Paternal Aunt     Melanoma Paternal Uncle     Anesthesia Reaction No family hx of     Bleeding Disorder No family hx of     Clotting Disorder No family hx of        Past/family/social history reviewed and no changes    PHYSICAL EXAMINATION:  Constitutional: AAOx3, cooperative, pleasant, not dyspneic/diaphoretic, no acute distress  Vitals reviewed: There were no vitals taken for this visit.  Wt:   Wt Readings from Last 2 Encounters:   04/22/25 77.6 kg (171 lb)   04/16/25 76.7 kg (169 lb)      Eyes: Sclera anicteric/injected  Ears/nose/mouth/throat: Normal oropharynx without  ulcers or exudate, mucus membranes moist, hearing intact  Neck: supple, thyroid normal size  CV: No edema  Respiratory: Unlabored breathing  Lymph: No axillary, submandibular, supraclavicular or inguinal lymphadenopathy  Abd:  Nondistended, +bs, no hepatosplenomegaly, nontender, no peritoneal signs  Skin: warm, perfused, no jaundice  Psych: Normal affect  MSK: Normal gait    PREVIOUS ENDOSCOPY:  No prior    PERTINENT STUDIES:  Labs  C.diff negative 4/22/25    Imaging  CT Abdomen and Pelvis 3/26/25:                                                                 IMPRESSION:   1. Grossly stable heterogeneously enhancing hepatic mass segment 5  compatible with biopsy-proven adenoma. No new hepatic lesion.  2. Hepatic vasculature is patent without anomalous variation.  3. Mild splenomegaly with dilated splenic vein. No overt imaging  features of cirrhosis.

## 2025-06-02 NOTE — NURSING NOTE
"Do you have a history of colon cancer in your immediate family? NO    If yes who: negative     And what age  were they diagnosed:       Chief Complaint   Patient presents with    New Patient       Vitals:    06/02/25 0722   BP: 108/71   Pulse: 78   SpO2: 98%   Weight: 77.7 kg (171 lb 4.8 oz)   Height: 1.905 m (6' 3\")       Body mass index is 21.41 kg/m .    Bindu Castro MA    "

## 2025-06-02 NOTE — PATIENT INSTRUCTIONS
It was a pleasure meeting with you today and discussing your healthcare plan. Below is a summary of what we covered:    -- Increase cholestyramine to twice daily  -- Labs for celiac, thyroid, and inflammation  -- stool tests for infection, parasite, inflammation  -- give update in about 2 weeks    Follow up in clinic in 3 months    Please see below for any additional questions and scheduling guidelines.    Sign up for [employer]: [employer] patient portal serves as a secure platform for accessing your medical records from the [facility]. Additionally, [employer] facilitates easy, timely, and secure messaging with your care team. If you have not signed up, you may do so by using the provided code or calling [phone].    Coordinating your care after your visit:  There are multiple options for scheduling your follow-up care based on your provider's recommendation.    How do I schedule a follow-up clinic appointment:   After your appointment, you may receive scheduling assistance with the Clinic Coordinators by having a seat in the waiting room and a Clinic Coordinator will call you up to schedule.  Virtual visits or after you leave the clinic:  Your provider has placed a follow-up order in the [employer] portal for scheduling your return appointment. A member of the scheduling team will contact you to schedule.  [employer] Scheduling: Timely scheduling through [employer] is advised to ensure appointment availability.   Call to schedule: You may schedule your follow-up appointment(s) by calling [phone], option 1.    How do I schedule my endoscopy or colonoscopy procedure:  If a procedure, such as a colonoscopy or upper endoscopy was ordered by your provider, the scheduling team will contact you to schedule this procedure. Or you may choose to call to schedule at   [phone], option 2.  Please allow 20-30 minutes when scheduling a procedure.    How do I get my blood work done? To get your blood work done, you need  to schedule a lab appointment at an [facility]. There are multiple ways to schedule:   At the clinic: The Clinic Coordinator you meet after your visit can help you schedule a lab appointment.   [employer] scheduling: [employer] offers online lab scheduling at all [facility] laboratory locations.   Call to schedule: You can call [phone] to schedule your lab appointment.    How do I schedule my imaging study: To schedule imaging studies, such as CT scans, ultrasounds, MRIs, or X-rays, contact Imaging Services at [phone].    How do I schedule a referral to another doctor: If your provider recommended a referral to another specialist(s), the referral order was placed by your provider. You will receive a phone call to schedule this referral, or you may choose to call the number attached to the referral to self-schedule.    For Post-Visit Question(s):  For any inquiries following today's visit:  Please utilize [employer] messaging and allow 48 hours for reply or contact the Call Center during normal business hours at [phone], option 3.  For Emergent After-hours questions, contact the On-Call GI Fellow through the [facility]  at [phone].  In addition, you may contact your Nurse directly using the provided contact information.    Test Results:  Test results will be accessible via [employer] in compliance with the 21st Century Cures Act. This means that your results will be available to you at the same time as your provider. Often you may see your results before your provider does. Results are reviewed by staff within two weeks with communication follow-up. Results may be released in the patient portal prior to your care team review.    Prescription Refill(s):  Medication prescribed by your provider will be addressed during your visit. For future refills, please coordinate with your pharmacy. If you have not had a recent clinic visit or routine labs, for your safety, your  provider may not be able to refill your prescription.

## 2025-06-04 ENCOUNTER — LAB (OUTPATIENT)
Dept: LAB | Facility: CLINIC | Age: 35
End: 2025-06-04
Payer: COMMERCIAL

## 2025-06-04 DIAGNOSIS — R19.7 DIARRHEA, UNSPECIFIED TYPE: ICD-10-CM

## 2025-06-04 LAB
ADV 40+41 DNA STL QL NAA+NON-PROBE: NEGATIVE
ASTRO TYP 1-8 RNA STL QL NAA+NON-PROBE: NEGATIVE
C CAYETANENSIS DNA STL QL NAA+NON-PROBE: NEGATIVE
CAMPYLOBACTER DNA SPEC NAA+PROBE: NEGATIVE
CRP SERPL-MCNC: <3 MG/L
CRYPTOSP DNA STL QL NAA+NON-PROBE: NEGATIVE
E COLI O157 DNA STL QL NAA+NON-PROBE: ABNORMAL
E HISTOLYT DNA STL QL NAA+NON-PROBE: NEGATIVE
EAEC ASTA GENE ISLT QL NAA+PROBE: NEGATIVE
EC STX1+STX2 GENES STL QL NAA+NON-PROBE: NEGATIVE
EPEC EAE GENE STL QL NAA+NON-PROBE: NEGATIVE
ETEC LTA+ST1A+ST1B TOX ST NAA+NON-PROBE: NEGATIVE
G LAMBLIA DNA STL QL NAA+NON-PROBE: NEGATIVE
NOROVIRUS GI+II RNA STL QL NAA+NON-PROBE: POSITIVE
P SHIGELLOIDES DNA STL QL NAA+NON-PROBE: NEGATIVE
RVA RNA STL QL NAA+NON-PROBE: NEGATIVE
SALMONELLA SP RPOD STL QL NAA+PROBE: NEGATIVE
SAPO I+II+IV+V RNA STL QL NAA+NON-PROBE: POSITIVE
SHIGELLA SP+EIEC IPAH ST NAA+NON-PROBE: NEGATIVE
TSH SERPL DL<=0.005 MIU/L-ACNC: 0.96 UIU/ML (ref 0.3–4.2)
V CHOLERAE DNA SPEC QL NAA+PROBE: NEGATIVE
VIBRIO DNA SPEC NAA+PROBE: NEGATIVE
Y ENTEROCOL DNA STL QL NAA+PROBE: NEGATIVE

## 2025-06-04 PROCEDURE — 83993 ASSAY FOR CALPROTECTIN FECAL: CPT | Performed by: DIETITIAN, REGISTERED

## 2025-06-04 PROCEDURE — 87507 IADNA-DNA/RNA PROBE TQ 12-25: CPT | Performed by: DIETITIAN, REGISTERED

## 2025-06-04 PROCEDURE — 99000 SPECIMEN HANDLING OFFICE-LAB: CPT | Performed by: PATHOLOGY

## 2025-06-04 PROCEDURE — 87209 SMEAR COMPLEX STAIN: CPT | Performed by: DIETITIAN, REGISTERED

## 2025-06-04 PROCEDURE — 87177 OVA AND PARASITES SMEARS: CPT | Performed by: DIETITIAN, REGISTERED

## 2025-06-05 LAB
IGA SERPL-MCNC: 159 MG/DL (ref 84–499)
O+P STL MICRO: NEGATIVE
SPECIMEN TYPE: NORMAL

## 2025-06-09 ENCOUNTER — RESULTS FOLLOW-UP (OUTPATIENT)
Dept: GASTROENTEROLOGY | Facility: CLINIC | Age: 35
End: 2025-06-09

## 2025-06-09 DIAGNOSIS — R19.7 DIARRHEA, UNSPECIFIED TYPE: ICD-10-CM

## 2025-06-11 ENCOUNTER — PATIENT OUTREACH (OUTPATIENT)
Dept: CARE COORDINATION | Facility: CLINIC | Age: 35
End: 2025-06-11
Payer: COMMERCIAL

## 2025-06-16 RX ORDER — CHOLESTYRAMINE 4 G/9G
1 POWDER, FOR SUSPENSION ORAL 2 TIMES DAILY WITH MEALS
Qty: 60 PACKET | Refills: 3 | Status: SHIPPED | OUTPATIENT
Start: 2025-06-16

## 2025-07-07 ENCOUNTER — THERAPY VISIT (OUTPATIENT)
Dept: PHYSICAL THERAPY | Facility: CLINIC | Age: 35
End: 2025-07-07
Payer: COMMERCIAL

## 2025-07-07 DIAGNOSIS — M62.81 GENERALIZED MUSCLE WEAKNESS: ICD-10-CM

## 2025-07-07 DIAGNOSIS — Z47.89 AFTERCARE FOLLOWING SURGERY OF THE MUSCULOSKELETAL SYSTEM: Primary | ICD-10-CM

## 2025-07-07 PROCEDURE — 97530 THERAPEUTIC ACTIVITIES: CPT | Mod: GP | Performed by: PHYSICAL THERAPIST

## 2025-07-07 PROCEDURE — 97110 THERAPEUTIC EXERCISES: CPT | Mod: GP | Performed by: PHYSICAL THERAPIST

## 2025-07-07 PROCEDURE — 97112 NEUROMUSCULAR REEDUCATION: CPT | Mod: GP | Performed by: PHYSICAL THERAPIST

## 2025-07-21 ENCOUNTER — THERAPY VISIT (OUTPATIENT)
Dept: PHYSICAL THERAPY | Facility: CLINIC | Age: 35
End: 2025-07-21
Payer: COMMERCIAL

## 2025-07-21 DIAGNOSIS — M62.81 GENERALIZED MUSCLE WEAKNESS: ICD-10-CM

## 2025-07-21 DIAGNOSIS — Z47.89 AFTERCARE FOLLOWING SURGERY OF THE MUSCULOSKELETAL SYSTEM: Primary | ICD-10-CM

## 2025-07-21 PROCEDURE — 97530 THERAPEUTIC ACTIVITIES: CPT | Mod: GP | Performed by: PHYSICAL THERAPIST

## 2025-07-21 PROCEDURE — 97112 NEUROMUSCULAR REEDUCATION: CPT | Mod: GP | Performed by: PHYSICAL THERAPIST

## 2025-07-21 PROCEDURE — 97110 THERAPEUTIC EXERCISES: CPT | Mod: GP | Performed by: PHYSICAL THERAPIST

## 2025-08-22 ENCOUNTER — MYC REFILL (OUTPATIENT)
Dept: FAMILY MEDICINE | Facility: CLINIC | Age: 35
End: 2025-08-22
Payer: COMMERCIAL

## 2025-08-22 DIAGNOSIS — I10 BENIGN ESSENTIAL HYPERTENSION: ICD-10-CM

## 2025-08-24 RX ORDER — AMLODIPINE BESYLATE 5 MG/1
5 TABLET ORAL DAILY
Qty: 90 TABLET | Refills: 3 | Status: SHIPPED | OUTPATIENT
Start: 2025-08-24

## 2025-08-25 ENCOUNTER — THERAPY VISIT (OUTPATIENT)
Dept: PHYSICAL THERAPY | Facility: CLINIC | Age: 35
End: 2025-08-25
Payer: COMMERCIAL

## 2025-08-25 DIAGNOSIS — M62.81 GENERALIZED MUSCLE WEAKNESS: ICD-10-CM

## 2025-08-25 DIAGNOSIS — Z47.89 AFTERCARE FOLLOWING SURGERY OF THE MUSCULOSKELETAL SYSTEM: Primary | ICD-10-CM

## 2025-08-25 PROCEDURE — 97530 THERAPEUTIC ACTIVITIES: CPT | Mod: GP | Performed by: PHYSICAL THERAPIST

## 2025-08-25 PROCEDURE — 97112 NEUROMUSCULAR REEDUCATION: CPT | Mod: GP | Performed by: PHYSICAL THERAPIST

## 2025-08-25 PROCEDURE — 97110 THERAPEUTIC EXERCISES: CPT | Mod: GP | Performed by: PHYSICAL THERAPIST

## (undated) DEVICE — TOURNIQUET VASCULAR KIT ARGYLE 8888-585000

## (undated) DEVICE — SU PDS II 0 TP-1 60" Z991G

## (undated) DEVICE — VESSEL LOOPS YELLOW MAXI 31145694

## (undated) DEVICE — ESU GROUND PAD THERMOGUARD PLUS ABC PEDS 7-382

## (undated) DEVICE — SU PROLENE 5-0 RB-1DA 36"  8556H

## (undated) DEVICE — GLOVE BIOGEL PI MICRO INDICATOR UNDERGLOVE SZ 7.5 48975

## (undated) DEVICE — SPONGE KITTNER 30-101

## (undated) DEVICE — SU PROLENE 4-0 SHDA 36" 8521H

## (undated) DEVICE — SPONGE LAP 18X18" X8435

## (undated) DEVICE — Device

## (undated) DEVICE — SU VICRYL+ 3-0 27IN SH UND VCP416H

## (undated) DEVICE — SURGICEL ABSORBABLE HEMOSTAT SNOW 4"X4" 2083

## (undated) DEVICE — DRAPE IOBAN INCISE 23X17" 6650EZ

## (undated) DEVICE — SU SILK 3-0 SH CR 8X18" C013D

## (undated) DEVICE — SYR BULB IRRIG DOVER 60 ML LATEX FREE 67000

## (undated) DEVICE — DRAPE POUCH INSTRUMENT 1018

## (undated) DEVICE — STPL ENDO LINEAR CUT ECHELON GST 45MM COMPACT PCEE45A

## (undated) DEVICE — DRAPE SHEET REV FOLD 3/4 9349

## (undated) DEVICE — CLIP HORIZON SM RED WIDE SLOT 001201

## (undated) DEVICE — SU MONOCRYL 4-0 PS-2 27" UND Y426H

## (undated) DEVICE — SU SILK 0 TIE 6X30" A306H

## (undated) DEVICE — ESU HANDPIECE ABC BEND-A-BEAM 6" 134006

## (undated) DEVICE — STPL POWERED ECHELON VASC 35MM PVE35A

## (undated) DEVICE — LINEN TOWEL PACK X6 WHITE 5487

## (undated) DEVICE — SUCTION MANIFOLD NEPTUNE 2 SYS 4 PORT 0702-020-000

## (undated) DEVICE — SU SILK 3-0 TIE 12X30" A304H

## (undated) DEVICE — SOL NACL 0.9% IRRIG 1000ML BOTTLE 2F7124

## (undated) DEVICE — GLOVE BIOGEL PI MICRO SZ 7.0 48570

## (undated) DEVICE — CLIP HORIZON MED BLUE 002200

## (undated) DEVICE — CUSA 36KHZ WRENCH TORQUE CLARITY C7602

## (undated) DEVICE — CUSA TUBE QUICK CONNECT CLARITY C7300

## (undated) DEVICE — SU DERMABOND ADVANCED .7ML DNX12

## (undated) DEVICE — SOL WATER IRRIG 1000ML BOTTLE 2F7114

## (undated) DEVICE — RX VISTASEAL FIBRIN SEALANT W/THROMBIN 10ML VST10

## (undated) DEVICE — WIPE PREMOIST CLEANSING WASHCLOTHS 7988

## (undated) DEVICE — ESU BIPOLAR SEALER AQUAMANTYS 6MM 23-112-1

## (undated) DEVICE — CUSA 36KHZ TIP CVD EXT MICROTIP CLARITY C74115

## (undated) DEVICE — CATH TRAY FOLEY SURESTEP 16FR W/TMP PRB STLK LATEX A319416AM

## (undated) DEVICE — CUSA 36KHZ TIP CVD  EXT SHEARTIP CLARITY C74175

## (undated) DEVICE — HEMOSTAT HEMOBLAST AGENT L10 SH CANNULA BQF02-US

## (undated) DEVICE — ESU HOLSTER PLASTIC DISP E2400

## (undated) DEVICE — PREP CHLORAPREP 26ML TINTED HI-LITE ORANGE 930815

## (undated) DEVICE — PACK THORACOTOMY UMMC LF SCV32THF13

## (undated) DEVICE — CLIP HORIZON LG ORANGE 004200

## (undated) DEVICE — SU PROLENE 6-0 RB-2DA 30" 8711H

## (undated) DEVICE — SU SILK 2-0 TIE 12X30" A305H

## (undated) DEVICE — SU SILK 4-0 TIE 12X30" A303H

## (undated) DEVICE — LINEN TOWEL PACK X30 5481

## (undated) DEVICE — SU CHROMIC 1 BP 27" 48G

## (undated) RX ORDER — HYDROMORPHONE HCL IN WATER/PF 6 MG/30 ML
PATIENT CONTROLLED ANALGESIA SYRINGE INTRAVENOUS
Status: DISPENSED
Start: 2025-04-09

## (undated) RX ORDER — FENTANYL CITRATE 50 UG/ML
INJECTION, SOLUTION INTRAMUSCULAR; INTRAVENOUS
Status: DISPENSED
Start: 2025-04-09

## (undated) RX ORDER — CEFAZOLIN SODIUM 1 G/3ML
INJECTION, POWDER, FOR SOLUTION INTRAMUSCULAR; INTRAVENOUS
Status: DISPENSED
Start: 2025-04-09

## (undated) RX ORDER — EPHEDRINE SULFATE 50 MG/ML
INJECTION, SOLUTION INTRAMUSCULAR; INTRAVENOUS; SUBCUTANEOUS
Status: DISPENSED
Start: 2025-04-09

## (undated) RX ORDER — HYDROMORPHONE HYDROCHLORIDE 1 MG/ML
INJECTION, SOLUTION INTRAMUSCULAR; INTRAVENOUS; SUBCUTANEOUS
Status: DISPENSED
Start: 2025-04-09

## (undated) RX ORDER — ONDANSETRON 2 MG/ML
INJECTION INTRAMUSCULAR; INTRAVENOUS
Status: DISPENSED
Start: 2025-04-09

## (undated) RX ORDER — FENTANYL CITRATE-0.9 % NACL/PF 10 MCG/ML
PLASTIC BAG, INJECTION (ML) INTRAVENOUS
Status: DISPENSED
Start: 2025-04-09

## (undated) RX ORDER — BUPIVACAINE HYDROCHLORIDE AND EPINEPHRINE 2.5; 5 MG/ML; UG/ML
INJECTION, SOLUTION EPIDURAL; INFILTRATION; INTRACAUDAL; PERINEURAL
Status: DISPENSED
Start: 2025-04-09

## (undated) RX ORDER — HEPARIN SODIUM 1000 [USP'U]/ML
INJECTION, SOLUTION INTRAVENOUS; SUBCUTANEOUS
Status: DISPENSED
Start: 2025-04-09

## (undated) RX ORDER — ENOXAPARIN SODIUM 100 MG/ML
INJECTION SUBCUTANEOUS
Status: DISPENSED
Start: 2025-04-09

## (undated) RX ORDER — DEXAMETHASONE SODIUM PHOSPHATE 4 MG/ML
INJECTION, SOLUTION INTRA-ARTICULAR; INTRALESIONAL; INTRAMUSCULAR; INTRAVENOUS; SOFT TISSUE
Status: DISPENSED
Start: 2025-04-09

## (undated) RX ORDER — PROPOFOL 10 MG/ML
INJECTION, EMULSION INTRAVENOUS
Status: DISPENSED
Start: 2025-04-09